# Patient Record
Sex: MALE | Race: BLACK OR AFRICAN AMERICAN | NOT HISPANIC OR LATINO | URBAN - METROPOLITAN AREA
[De-identification: names, ages, dates, MRNs, and addresses within clinical notes are randomized per-mention and may not be internally consistent; named-entity substitution may affect disease eponyms.]

---

## 2018-01-02 ENCOUNTER — EMERGENCY (EMERGENCY)
Facility: HOSPITAL | Age: 23
LOS: 1 days | Discharge: HOME | End: 2018-01-02
Admitting: EMERGENCY MEDICINE
Payer: COMMERCIAL

## 2018-01-02 VITALS
RESPIRATION RATE: 18 BRPM | HEART RATE: 84 BPM | DIASTOLIC BLOOD PRESSURE: 78 MMHG | OXYGEN SATURATION: 98 % | TEMPERATURE: 98 F | SYSTOLIC BLOOD PRESSURE: 124 MMHG
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
## 2018-01-02 DIAGNOSIS — M79.644 PAIN IN RIGHT FINGER(S): ICD-10-CM
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
## 2018-01-02 DIAGNOSIS — S60.011A CONTUSION OF RIGHT THUMB WITHOUT DAMAGE TO NAIL, INITIAL ENCOUNTER: ICD-10-CM
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
## 2018-01-02 DIAGNOSIS — S61.011A LACERATION WITHOUT FOREIGN BODY OF RIGHT THUMB WITHOUT DAMAGE TO NAIL, INITIAL ENCOUNTER: ICD-10-CM

## 2018-01-02 PROCEDURE — 73140 X-RAY EXAM OF FINGER(S): CPT | Mod: 26,RT

## 2018-01-02 PROCEDURE — 99283 EMERGENCY DEPT VISIT LOW MDM: CPT | Mod: 25

## 2018-01-02 RX ORDER — CEPHALEXIN 500 MG
500 CAPSULE ORAL ONCE
Qty: 0 | Refills: 0 | Status: COMPLETED | OUTPATIENT
Start: 2018-01-02 | End: 2018-01-02

## 2018-01-02 RX ORDER — CEPHALEXIN 500 MG
1 CAPSULE ORAL
Qty: 28 | Refills: 0 | OUTPATIENT
Start: 2018-01-02 | End: 2018-01-08

## 2018-01-02 RX ADMIN — Medication 500 MILLIGRAM(S): at 03:38

## 2018-01-02 NOTE — ED PROVIDER NOTE - MEDICAL DECISION MAKING DETAILS
No fracture or dislocation on xray. Likely flexor tendon injury. No signs of infection at this time. Wound is too old to suture. Cleaned/bandaged, Rx Keflex, Ortho/Hand F/U in 24-48 hours for further management and wound check. Strict return precautions reviewed with pt in which pt verbalizes understanding and agrees to.

## 2018-01-02 NOTE — ED PROVIDER NOTE - OBJECTIVE STATEMENT
23 y/o M presents c/o right thumb injury after accidentally slamming his thumb in a car door 1 week ago. He states he sustained a laceration which he cleaned thoroughly and bandaged however he never seeked medical attention. He now p/w persistent localized pain, paresthesias and inability to flex his thumb. States he is concerned he has a fracture.

## 2018-01-02 NOTE — ED PROVIDER NOTE - SKIN WOUND TYPE
LACERATION(S)/1.5cm laceration to palmar aspect of right thumb. No swelling, fluctuance, warmth, erythema, streaking or discharge.

## 2022-04-03 ENCOUNTER — HOSPITAL ENCOUNTER (EMERGENCY)
Age: 27
Discharge: HOME | End: 2022-04-04
Attending: EMERGENCY MEDICINE

## 2022-04-03 DIAGNOSIS — F15.10 METHAMPHETAMINE ABUSE (HCC): ICD-10-CM

## 2022-04-03 DIAGNOSIS — R45.851 SUICIDAL IDEATION: Primary | ICD-10-CM

## 2022-04-03 DIAGNOSIS — Z59.00 HOMELESSNESS: ICD-10-CM

## 2022-04-03 LAB
ALBUMIN SERPL-MCNC: 3.3 G/DL (ref 3.5–5)
ALBUMIN/GLOB SERPL: 1.1 {RATIO} (ref 1.2–3.5)
ALP SERPL-CCNC: 43 U/L (ref 50–136)
ALT SERPL-CCNC: 22 U/L (ref 12–65)
AMPHET UR QL SCN: POSITIVE
ANION GAP SERPL CALC-SCNC: 6 MMOL/L (ref 7–16)
APAP SERPL-MCNC: <2 UG/ML (ref 10–30)
AST SERPL-CCNC: 12 U/L (ref 15–37)
ATRIAL RATE: 87 BPM
BARBITURATES UR QL SCN: NEGATIVE
BASOPHILS # BLD: 0 K/UL (ref 0–0.2)
BASOPHILS NFR BLD: 0 % (ref 0–2)
BENZODIAZ UR QL: NEGATIVE
BILIRUB SERPL-MCNC: 0.3 MG/DL (ref 0.2–1.1)
BILIRUB UR QL: NEGATIVE
BUN SERPL-MCNC: 10 MG/DL (ref 6–23)
CALCIUM SERPL-MCNC: 8.6 MG/DL (ref 8.3–10.4)
CALCULATED P AXIS, ECG09: 77 DEGREES
CALCULATED R AXIS, ECG10: 74 DEGREES
CALCULATED T AXIS, ECG11: 56 DEGREES
CANNABINOIDS UR QL SCN: POSITIVE
CHLORIDE SERPL-SCNC: 105 MMOL/L (ref 98–107)
CO2 SERPL-SCNC: 30 MMOL/L (ref 21–32)
COCAINE UR QL SCN: NEGATIVE
CREAT SERPL-MCNC: 0.8 MG/DL (ref 0.8–1.5)
DIAGNOSIS, 93000: NORMAL
DIFFERENTIAL METHOD BLD: ABNORMAL
EOSINOPHIL # BLD: 0.3 K/UL (ref 0–0.8)
EOSINOPHIL NFR BLD: 3 % (ref 0.5–7.8)
ERYTHROCYTE [DISTWIDTH] IN BLOOD BY AUTOMATED COUNT: 14.3 % (ref 11.9–14.6)
ETHANOL SERPL-MCNC: <3 MG/DL
GLOBULIN SER CALC-MCNC: 3 G/DL (ref 2.3–3.5)
GLUCOSE SERPL-MCNC: 71 MG/DL (ref 65–100)
GLUCOSE UR QL STRIP.AUTO: NEGATIVE MG/DL
HCT VFR BLD AUTO: 40.4 % (ref 41.1–50.3)
HGB BLD-MCNC: 13.3 G/DL (ref 13.6–17.2)
IMM GRANULOCYTES # BLD AUTO: 0 K/UL (ref 0–0.5)
IMM GRANULOCYTES NFR BLD AUTO: 0 % (ref 0–5)
KETONES UR-MCNC: NEGATIVE MG/DL
LEUKOCYTE ESTERASE UR QL STRIP: NEGATIVE
LYMPHOCYTES # BLD: 2.5 K/UL (ref 0.5–4.6)
LYMPHOCYTES NFR BLD: 24 % (ref 13–44)
MAGNESIUM SERPL-MCNC: 2.1 MG/DL (ref 1.8–2.4)
MCH RBC QN AUTO: 27.9 PG (ref 26.1–32.9)
MCHC RBC AUTO-ENTMCNC: 32.9 G/DL (ref 31.4–35)
MCV RBC AUTO: 84.7 FL (ref 79.6–97.8)
METHADONE UR QL: NEGATIVE
MONOCYTES # BLD: 0.9 K/UL (ref 0.1–1.3)
MONOCYTES NFR BLD: 9 % (ref 4–12)
NEUTS SEG # BLD: 6.6 K/UL (ref 1.7–8.2)
NEUTS SEG NFR BLD: 64 % (ref 43–78)
NITRITE UR QL: NEGATIVE
NRBC # BLD: 0 K/UL (ref 0–0.2)
OPIATES UR QL: NEGATIVE
P-R INTERVAL, ECG05: 154 MS
PCP UR QL: NEGATIVE
PH UR: 7 [PH] (ref 5–9)
PLATELET # BLD AUTO: 289 K/UL (ref 150–450)
PMV BLD AUTO: 9.5 FL (ref 9.4–12.3)
POTASSIUM SERPL-SCNC: 3.5 MMOL/L (ref 3.5–5.1)
PROT SERPL-MCNC: 6.3 G/DL (ref 6.3–8.2)
PROT UR QL: NEGATIVE MG/DL
Q-T INTERVAL, ECG07: 378 MS
QRS DURATION, ECG06: 84 MS
QTC CALCULATION (BEZET), ECG08: 454 MS
RBC # BLD AUTO: 4.77 M/UL (ref 4.23–5.6)
RBC # UR STRIP: ABNORMAL /UL
SALICYLATES SERPL-MCNC: 1.8 MG/DL (ref 2.8–20)
SERVICE CMNT-IMP: ABNORMAL
SODIUM SERPL-SCNC: 141 MMOL/L (ref 138–145)
SP GR UR: 1.01 (ref 1–1.02)
UROBILINOGEN UR QL: 0.2 EU/DL (ref 0.2–1)
VENTRICULAR RATE, ECG03: 87 BPM
WBC # BLD AUTO: 10.4 K/UL (ref 4.3–11.1)

## 2022-04-03 PROCEDURE — 83735 ASSAY OF MAGNESIUM: CPT

## 2022-04-03 PROCEDURE — 82077 ASSAY SPEC XCP UR&BREATH IA: CPT

## 2022-04-03 PROCEDURE — 80143 DRUG ASSAY ACETAMINOPHEN: CPT

## 2022-04-03 PROCEDURE — 99285 EMERGENCY DEPT VISIT HI MDM: CPT

## 2022-04-03 PROCEDURE — 85025 COMPLETE CBC W/AUTO DIFF WBC: CPT

## 2022-04-03 PROCEDURE — 80053 COMPREHEN METABOLIC PANEL: CPT

## 2022-04-03 PROCEDURE — 80307 DRUG TEST PRSMV CHEM ANLYZR: CPT

## 2022-04-03 PROCEDURE — 74011250637 HC RX REV CODE- 250/637: Performed by: EMERGENCY MEDICINE

## 2022-04-03 PROCEDURE — 80179 DRUG ASSAY SALICYLATE: CPT

## 2022-04-03 PROCEDURE — 93005 ELECTROCARDIOGRAM TRACING: CPT | Performed by: EMERGENCY MEDICINE

## 2022-04-03 PROCEDURE — 81003 URINALYSIS AUTO W/O SCOPE: CPT

## 2022-04-03 RX ORDER — QUETIAPINE FUMARATE 25 MG/1
50 TABLET, FILM COATED ORAL
Status: DISCONTINUED | OUTPATIENT
Start: 2022-04-03 | End: 2022-04-05 | Stop reason: HOSPADM

## 2022-04-03 RX ORDER — QUETIAPINE FUMARATE 25 MG/1
25 TABLET, FILM COATED ORAL
Status: DISCONTINUED | OUTPATIENT
Start: 2022-04-03 | End: 2022-04-05 | Stop reason: HOSPADM

## 2022-04-03 RX ADMIN — QUETIAPINE FUMARATE 50 MG: 25 TABLET ORAL at 21:05

## 2022-04-03 SDOH — ECONOMIC STABILITY - HOUSING INSECURITY: HOMELESSNESS UNSPECIFIED: Z59.00

## 2022-04-03 NOTE — ED PROVIDER NOTES
61-year-old male with history of homelessness, schizophrenia, medication noncompliance, marijuana use , methamphetamine use, presents with complaint of suicidal ideations. Patient was seen at [facility] ED earlier today with similar complaints. Patient with no plan at that time. Patient states that his current plan is to drive car into a gas station and it to set on fire and explored. Patient states he currently lives at the [facility] homeless shelter. Denies HI, AVH, paranoid delusions. Denies chest pain, shortness breath, nausea, vomiting, fever, chills. States that he smokes cigarettes. The history is provided by the patient. No  was used. Mental Health Problem   This is a recurrent problem. The current episode started more than 2 days ago. The problem has not changed since onset. Pertinent negatives include no confusion, no somnolence, no seizures, no weakness, no agitation and no hallucinations. History reviewed. No pertinent past medical history. History reviewed. No pertinent surgical history. History reviewed. No pertinent family history.     Social History     Socioeconomic History    Marital status: SINGLE     Spouse name: Not on file    Number of children: Not on file    Years of education: Not on file    Highest education level: Not on file   Occupational History    Not on file   Tobacco Use    Smoking status: Not on file    Smokeless tobacco: Not on file   Substance and Sexual Activity    Alcohol use: Not on file    Drug use: Not on file    Sexual activity: Not on file   Other Topics Concern    Not on file   Social History Narrative    Not on file     Social Determinants of Health     Financial Resource Strain:     Difficulty of Paying Living Expenses: Not on file   Food Insecurity:     Worried About Running Out of Food in the Last Year: Not on file    Guerline of Food in the Last Year: Not on file   Transportation Needs:     Lack of Transportation (Medical): Not on file    Lack of Transportation (Non-Medical): Not on file   Physical Activity:     Days of Exercise per Week: Not on file    Minutes of Exercise per Session: Not on file   Stress:     Feeling of Stress : Not on file   Social Connections:     Frequency of Communication with Friends and Family: Not on file    Frequency of Social Gatherings with Friends and Family: Not on file    Attends Roman [employer] Services: Not on file    Active Member of [address] [place] [address] [employer] or Organizations: Not on file    Attends Club or Organization Meetings: Not on file    Marital Status: Not on file   Intimate Partner Violence:     Fear of Current or Ex-Partner: Not on file    Emotionally Abused: Not on file    Physically Abused: Not on file    Sexually Abused: Not on file   Housing Stability:     Unable to Pay for Housing in the Last Year: Not on file    Number of [place] in the Last Year: Not on file    Unstable Housing in the Last Year: Not on file         ALLERGIES: Patient has no known allergies. Review of Systems   Constitutional: Negative for chills, fatigue and fever. HENT: Negative for congestion and rhinorrhea. Respiratory: Negative for cough and shortness of breath. Cardiovascular: Negative for chest pain and palpitations. Gastrointestinal: Negative for abdominal pain, diarrhea, nausea and vomiting. Genitourinary: Negative for dysuria and flank pain. Musculoskeletal: Negative for arthralgias, back pain and myalgias. Skin: Negative for rash and wound. Neurological: Negative for dizziness, seizures, weakness and headaches. Hematological: Does not bruise/bleed easily. Psychiatric/Behavioral: Positive for suicidal ideas. Negative for agitation, confusion and hallucinations. The patient is not nervous/anxious.         Vitals:    04/03/22 0224   BP: 126/81   Pulse: (!) 102   Resp: 20   Temp: 98 °F (36.7 °C)   SpO2: 99%   Weight: 63.5 kg (140 lb)   Height: 6' 2\" (1.88 m)            Physical Exam  Vitals and nursing note reviewed. Constitutional:       Comments: Malodorous. Disheveled in appearance. HENT:      Head: Normocephalic. Nose: Nose normal.      Mouth/Throat:      Mouth: Mucous membranes are moist.   Eyes:      Extraocular Movements: Extraocular movements intact. Conjunctiva/sclera: Conjunctivae normal.      Pupils: Pupils are equal, round, and reactive to light. Cardiovascular:      Rate and Rhythm: Normal rate. Pulses: Normal pulses. Heart sounds: Normal heart sounds. Pulmonary:      Effort: Pulmonary effort is normal.      Breath sounds: Normal breath sounds. Abdominal:      General: Bowel sounds are normal.      Palpations: Abdomen is soft. Tenderness: There is no abdominal tenderness. There is no guarding or rebound. Comments: Soft, nontender, nondistended. No rebound or guarding   Musculoskeletal:         General: No swelling. Normal range of motion. Right lower leg: No edema. Left lower leg: No edema. Skin:     Findings: No erythema or rash. Neurological:      General: No focal deficit present. Mental Status: He is alert and oriented to person, place, and time. Cranial Nerves: No cranial nerve deficit. Motor: No weakness. Psychiatric:      Comments: Reports current SI. Denies HI, AVH, paranoid delusions. Agitated. MDM  Number of Diagnoses or Management Options  Homelessness: new and requires workup  Malingering: new and requires workup  Diagnosis management comments: Patient was seen at [facility] ED earlier today and discharged. Patient with history of malingering. Psychiatry been consulted to evaluate patient. Awaiting recommendations.        Amount and/or Complexity of Data Reviewed  Clinical lab tests: ordered and reviewed  Tests in the medicine section of CPT®: ordered and reviewed  Review and summarize past medical records: yes  Discuss the patient with other providers: yes  Independent visualization of images, tracings, or specimens: yes    Risk of Complications, Morbidity, and/or Mortality  Presenting problems: moderate  Diagnostic procedures: moderate  Management options: moderate  General comments: Results Include:    Recent Results (from the past 24 hour(s))  -EKG, 12 LEAD, INITIAL:   Collection Time: 04/03/22  2:59 AM       Result                      Value             Ref Range           Ventricular Rate            87                BPM                 Atrial Rate                 87                BPM                 P-R Interval                154               ms                  QRS Duration                84                ms                  Q-T Interval                378               ms                  QTC Calculation (Bezet)     454               ms                  Calculated P Axis           77                degrees             Calculated R Axis           74                degrees             Calculated T Axis           56                degrees             Diagnosis                                                     Normal sinus rhythm Nonspecific ST abnormality Abnormal ECG No previous ECGs available   -CBC WITH AUTOMATED DIFF:   Collection Time: 04/03/22  3:13 AM       Result                      Value             Ref Range           WBC                         10.4              4.3 - 11.1 K*       RBC                         4.77              4.23 - 5.6 M*       HGB                         13.3 (L)          13.6 - 17.2 *       HCT                         40.4 (L)          41.1 - 50.3 %       MCV                         84.7              79.6 - 97.8 *       MCH                         27.9              26.1 - 32.9 *       MCHC                        32.9              31.4 - 35.0 *       RDW                         14.3              11.9 - 14.6 %       PLATELET                    289               150 - 450 K/*       MPV                         9.5               9.4 - 12.3 FL ABSOLUTE NRBC               0.00              0.0 - 0.2 K/*       DF                          AUTOMATED                             NEUTROPHILS                 64                43 - 78 %           LYMPHOCYTES                 24                13 - 44 %           MONOCYTES                   9                 4.0 - 12.0 %        EOSINOPHILS                 3                 0.5 - 7.8 %         BASOPHILS                   0                 0.0 - 2.0 %         IMMATURE GRANULOCYTES       0                 0.0 - 5.0 %         ABS. NEUTROPHILS            6.6               1.7 - 8.2 K/*       ABS. LYMPHOCYTES            2.5               0.5 - 4.6 K/*       ABS. MONOCYTES              0.9               0.1 - 1.3 K/*       ABS. EOSINOPHILS            0.3               0.0 - 0.8 K/*       ABS. BASOPHILS              0.0               0.0 - 0.2 K/*       ABS. IMM.  GRANS.            0.0               0.0 - 0.5 K/*  -METABOLIC PANEL, COMPREHENSIVE:   Collection Time: 04/03/22  3:13 AM       Result                      Value             Ref Range           Sodium                      141               138 - 145 mm*       Potassium                   3.5               3.5 - 5.1 mm*       Chloride                    105               98 - 107 mmo*       CO2                         30                21 - 32 mmol*       Anion gap                   6 (L)             7 - 16 mmol/L       Glucose                     71                65 - 100 mg/*       BUN                         10                6 - 23 MG/DL        Creatinine                  0.80              0.8 - 1.5 MG*       GFR est AA                  >60               >60 ml/min/1*       GFR est non-AA              >60               >60 ml/min/1*       Calcium                     8.6               8.3 - 10.4 M*       Bilirubin, total            0.3               0.2 - 1.1 MG*       ALT (SGPT)                  22                12 - 65 U/L         AST (SGOT)                  12 (L)            15 - 37 U/L         Alk. phosphatase            43 (L)            50 - 136 U/L        Protein, total              6.3               6.3 - 8.2 g/*       Albumin                     3.3 (L)           3.5 - 5.0 g/*       Globulin                    3.0               2.3 - 3.5 g/*       A-G Ratio                   1.1 (L)           1.2 - 3.5      -SALICYLATE:   Collection Time: 04/03/22  3:13 AM       Result                      Value             Ref Range           Salicylate level            1.8 (L)           2.8 - 20.0 M*  -ACETAMINOPHEN:   Collection Time: 04/03/22  3:13 AM       Result                      Value             Ref Range           Acetaminophen level         <2 (L)            10.0 - 30.0 *  -ETHYL ALCOHOL:   Collection Time: 04/03/22  3:13 AM       Result                      Value             Ref Range           ALCOHOL(ETHYL),SERUM        <3                MG/DL          -MAGNESIUM:   Collection Time: 04/03/22  3:13 AM       Result                      Value             Ref Range           Magnesium                   2.1               1.8 - 2.4 mg*      Patient Progress  Patient progress: stable    ED Course as of 04/03/22 0529   Sun Apr 03, 1852   0476 Salicylate level(!): 1.8 [DF]      ED Course User Index  [DF] Bladimir Grissom MD       EKG    Date/Time: 4/3/2022 5:29 AM  Performed by: Bladimir Grissom MD  Authorized by: Bladimir Grissom MD     ECG reviewed by ED Physician in the absence of a cardiologist: yes    Rate:     ECG rate:  87    ECG rate assessment: normal    Rhythm:     Rhythm: sinus rhythm    Ectopy:     Ectopy: none    QRS:     QRS axis:  Normal    QRS intervals:  Normal  Conduction:     Conduction: normal    ST segments:     ST segments:  Normal  T waves:     T waves: normal                             Curtis Theodore MD; 4/3/2022 @5:26 AM Voice dictation software was used during the making of this note.   This software is not perfect and grammatical and other typographical errors may be present.   This note has not been proofread for errors.  ===================================================================

## 2022-04-03 NOTE — ED NOTES
Patient awake in bed eating at this time. No signs or symptoms of distress. Respirations even and unlabored. Safety precautions in place.

## 2022-04-03 NOTE — ED NOTES
Constant Observer Yes - Name: Eduardo Benjamin   High risk patients are in line of sight at all times Yes   Excess equipment/medical supplies not necessary for the care of the patient removed Yes   All sharp or dangerous objects are removed from room: including but not limited to belts, pens & pencils, needles, medications, cosmetics, lighters, matches, nail files, watches, necklaces, glass objects, razors, razor blades, knives, aerosol sprays, drawstring pants, shoes, cords (telephone, call bells, etc.) cleaning wipes or other cleaning items, aluminum cans, not permanently attached wall décor Yes   Telephone/cell phone removed as well as TV remote (batteries can be swallowed) Yes   Patient belongings removed and secured Yes   Excess linen is removed from room Yes   All plastic bags are removed from the room and replaced with paper trash bags Yes   Patient is in paper scrubs or appropriate gown and using hospital socks with rubber soles Yes   No metal, hard eating utensils or hard plates are on meal tray Yes   Remove all cleaning agents used by [employer] Yes   If Crucifix is hanging on a nail, remove Crucifix as well as the nail Yes

## 2022-04-03 NOTE — ED NOTES
Constant Observer Yes - Name: Torsten Sauceda Observer Oriented YES   High risk patients are in line of sight at all times Yes   Excess equipment/medical supplies not necessary for the care of the patient removed Yes   All sharp or dangerous objects are removed from room: including but not limited to belts, pens & pencils, needles, medications, cosmetics, lighters, matches, nail files, watches, necklaces, glass objects, razors, razor blades, knives, aerosol sprays, drawstring pants, shoes, cords (telephone, call bells, etc.) cleaning wipes or other cleaning items, aluminum cans, not permanently attached wall décor Yes   Telephone/cell phone removed as well as TV remote (batteries can be swallowed) Yes   Patient belongings removed and labeled at nurses station Yes   Excess linen is removed from room Yes   All plastic bags are removed from the room and replaced with paper trash bags Yes   Patient is in paper scrubs or appropriate gown and using hospital socks with rubber soles Yes   No metal, hard eating utensils or hard plates are on meal tray Yes   Remove all cleaning agents used by [employer] Yes   If Crucifix is hanging on a nail, remove Crucifix as well as the nail Yes       *If any question above is answered \"No,\" documentation is required.

## 2022-04-03 NOTE — ED NOTES
Psychiatry feels the patient needs inpatient status. Of note patient has been evaluated multiple times at [facility] with alternate decision making by psychiatry there. Patient has been seen 13 times in the last 15 days for similar complaints though.   Patient may very well benefit from reevaluation with his ER stay to see if he is suitable before 3 days

## 2022-04-03 NOTE — ED NOTES
Patient asleep at this time. No signs or symptoms of distress. Respirations even and unlabored. Safety precautions in place.

## 2022-04-03 NOTE — ED NOTES
Patient changed into paper scrubs. No signs or symptoms of distress. Respirations even and unlabored. Safety precautions initiated.

## 2022-04-03 NOTE — ED TRIAGE NOTES
Pt presents to the ED from home with c/o SI ideations today. Pt states being under stress lately and had a plan to drive his vehicle off the side of the road.  Pt denies HI ideations, auditory hallucinations and visual hallucination

## 2022-04-03 NOTE — ED NOTES
Patient asleep in stretcher at this time. No signs or symptoms of distress. Respirations even and unlabored. Safety precautions in place.

## 2022-04-04 VITALS
HEART RATE: 91 BPM | TEMPERATURE: 97.2 F | DIASTOLIC BLOOD PRESSURE: 78 MMHG | HEIGHT: 74 IN | SYSTOLIC BLOOD PRESSURE: 125 MMHG | OXYGEN SATURATION: 98 % | BODY MASS INDEX: 17.97 KG/M2 | RESPIRATION RATE: 17 BRPM | WEIGHT: 140 LBS

## 2022-04-04 PROCEDURE — 74011250637 HC RX REV CODE- 250/637: Performed by: EMERGENCY MEDICINE

## 2022-04-04 RX ORDER — CLONAZEPAM 0.5 MG/1
1 TABLET ORAL
Status: COMPLETED | OUTPATIENT
Start: 2022-04-04 | End: 2022-04-04

## 2022-04-04 RX ORDER — IBUPROFEN 200 MG
1 TABLET ORAL EVERY 24 HOURS
Status: DISCONTINUED | OUTPATIENT
Start: 2022-04-04 | End: 2022-04-05 | Stop reason: HOSPADM

## 2022-04-04 RX ORDER — QUETIAPINE FUMARATE 50 MG/1
50 TABLET, FILM COATED ORAL 2 TIMES DAILY
Qty: 10 TABLET | Refills: 0 | Status: SHIPPED | OUTPATIENT
Start: 2022-04-04 | End: 2022-04-09

## 2022-04-04 RX ADMIN — CLONAZEPAM 1 MG: 0.5 TABLET ORAL at 13:17

## 2022-04-04 RX ADMIN — QUETIAPINE FUMARATE 25 MG: 25 TABLET ORAL at 13:17

## 2022-04-04 NOTE — ED NOTES
Constant Observer Yes - Name: Jose   Constant Observer Oriented YES   High risk patients are in line of sight at all times Yes   Excess equipment/medical supplies not necessary for the care of the patient removed Yes   All sharp or dangerous objects are removed from room: including but not limited to belts, pens & pencils, needles, medications, cosmetics, lighters, matches, nail files, watches, necklaces, glass objects, razors, razor blades, knives, aerosol sprays, drawstring pants, shoes, cords (telephone, call bells, etc.) cleaning wipes or other cleaning items, aluminum cans, not permanently attached wall décor Yes   Telephone/cell phone removed as well as TV remote (batteries can be swallowed) Yes   Patient belongings removed and labeled at nurses station Yes   Excess linen is removed from room Yes   All plastic bags are removed from the room and replaced with paper trash bags Yes   Patient is in paper scrubs or appropriate gown and using hospital socks with rubber soles Yes   No metal, hard eating utensils or hard plates are on meal tray Yes   Remove all cleaning agents used by [employer] Yes   If Crucifix is hanging on a nail, remove Crucifix as well as the nail Yes       *If any question above is answered \"No,\" documentation is required.

## 2022-04-04 NOTE — ED NOTES
Pt resting in bed, watching TV, no distress noted, respirations even and unlabored. Safety precautions in place. Sitter at bedside.

## 2022-04-04 NOTE — PROGRESS NOTES
[facility]                                                                                                 PATIENT INFORMATION   Patient Name: Mallory Inches: [de-identified] Patient MRN: [id number]   Address: [place] [address] Patient CSN: [id number]      Evangelical: Pentecostalism   Sex: Male Marital Status: SINGLE   : 1995 Age:   32 yrs   Home Phone: [phone]  Mobile Phone:           Race: BLACK/ Employer:   Not Employed   Language: ENGLISH Admitted/Arrived From:      ADMISSION INFORMATION   Admit Date: 4/3/2022 Admit Time:  2:46 AM   Patient Class: Emergency Service: Emergency   Admit Source: Non-health care facility* Admit Type: EMERGENCY   Admitting Provider:   Attending Provider: Karen Porter, *   Unit: [address] Emergency Dept Room/Bed: ER09/09    Admission Diagnosis:  and codes:      Emergency Complaint: Mental Health Problems                Discharge Date:   Discharge Time:     GUARANTOR INFORMATION   Name:  Mellisa Leon Address: [address] [place] [address]. Rel:  Self   Phone: [address] , [address] : 1995   EMERGENCY CONTACTS   Name: Desiree Stone Home:  Mobile:    [phone] Rel: Mother   COVERAGE INFORMATION   Primary Insurance:   N/A Subscriber:     Plan Name:   Pt Rel to Subscriber:     Claim Address: NA Sex:        Policy #:  N/A    Group #: N/A Group Name:       Auth #: N/A Ins Phone:         Secondary Insurance:   Subscriber:     Plan Name:   Pt Rel to Subscriber:     Claim Address: NA Sex:       Policy #: N/A    Group #: N/A Group Name: N/A   Auth #: N/A Ins Phone:         Accident Date:    Accident Type:     PROVIDER INFORMATION   PCP:         None PCP Phone:  None   Referring Prov:   No ref.  provider found Referring Phone:  Referring Fax:  N/A      Advanced Directive:  Not Received Research:     Lab Client:   Enrollment Status:              Printed on 22 10:55 AM Page

## 2022-04-04 NOTE — PROGRESS NOTES
Chart review complete, CM met with pt at bedside, pt found laying on stretcher alert and oriented x4, awakens easily to speak with CM, pt states he is homeless and was staying at the shelter up until last pm, states he has been having SI and has plan to run car off the road, pt states has been seen multiple times at [facility] and feels they were not helping him so he had the [address] to bring him to the ED for evaluation. Pt has been seen by tele psych and was placed on IVC papers which will need to be updated if needed on 4/6/22. CM also noted pt has multiple visits in the [facility] ED and was  instructed to follow up with [facility] and states he has not done so and wants CM to help with getting him there, CM told him that a referral can be made from the ED but he will have to go to the [facility] for them to agree to see him. Pt tells CM today that he is no longer having suicidal thoughts and wants to get a ride back to the [facility] department to get his car. MD updated and plans on holding pt for now. Pt remains calm. CM will remain available to assist as needed. Care Management Interventions  PCP Verified by CM:  Yes (made aware of [facility] and [facility])  MyChart Signup: No  Discharge Durable Medical Equipment: No  Physical Therapy Consult: No  Occupational Therapy Consult: No  Speech Therapy Consult: No  Support Systems: Parent(s)  Confirm Follow Up Transport: Family  Discharge Location  Patient Expects to be Discharged to[de-identified] Home

## 2022-04-04 NOTE — ED NOTES
Patient somewhat uncooperative at times loud and confrontational and other times asleep. Patient insisting he is no longer self harming thoughts. Have asked patient to be reevaluated by telepsychiatry. Yesterday they deemed patient needing for inpatient psychiatric setting.

## 2022-04-04 NOTE — PROGRESS NOTES
Pt found standing in doorway talking very loudly to staff, pending re-evaluation from tele psych, wants to go home.  CM will remain available, no responses from referral placed this am.

## 2022-04-04 NOTE — ED NOTES
Patient is on the phone talking to a friend about how people are out to kill him and he feels like dying and is extremely agitated

## 2022-04-04 NOTE — ED NOTES
Pt resting in bed, no distress noted, respirations even and unlabored. Safety precautions in place.  Sitter at bedside

## 2022-04-04 NOTE — ED NOTES
Pt resting in bed, no distress noted, respirations even and unlabored. Safety precautions in place. Sitter at bedside.

## 2022-04-04 NOTE — ED NOTES
Constant Observer Yes - Name: Werner Victoria   Constant Observer Oriented YES   High risk patients are in line of sight at all times Yes   Excess equipment/medical supplies not necessary for the care of the patient removed Yes   All sharp or dangerous objects are removed from room: including but not limited to belts, pens & pencils, needles, medications, cosmetics, lighters, matches, nail files, watches, necklaces, glass objects, razors, razor blades, knives, aerosol sprays, drawstring pants, shoes, cords (telephone, call bells, etc.) cleaning wipes or other cleaning items, aluminum cans, not permanently attached wall décor Yes   Telephone/cell phone removed as well as TV remote (batteries can be swallowed) Yes   Patient belongings removed and labeled at nurses station Yes   Excess linen is removed from room Yes   All plastic bags are removed from the room and replaced with paper trash bags Yes   Patient is in paper scrubs or appropriate gown and using hospital socks with rubber soles Yes   No metal, hard eating utensils or hard plates are on meal tray Yes   Remove all cleaning agents used by [employer] Yes   If Crucifix is hanging on a nail, remove Crucifix as well as the nail Yes       *If any question above is answered \"No,\" documentation is required.

## 2022-04-05 NOTE — ED PROVIDER NOTES
At the request of the patient, the patient reassessed by psychiatry. Psychiatry at this point thinks that the patient's commitment papers can be reversed and that his major underlying condition is drug abuse. We will reverse the patient's commitment, discharge him on a 5-day treatment of Seroquel with instructions to follow-up with mental health tomorrow. Will also be given a referral to [facility] should he so desire drug treatment, as well as referral to [facility].

## 2022-04-05 NOTE — ED NOTES
Patient discharged at this time. Patient in no acute distress. Safety plan created with patient. Patient verbalizes understanding of safety plan. Denies any SI/HI ideations at this time. VSS. Prescriptions discussed and patient verbalizes understanding of necessity.

## 2022-05-06 ENCOUNTER — HOSPITAL ENCOUNTER (EMERGENCY)
Age: 27
Discharge: HOME | End: 2022-05-08
Attending: EMERGENCY MEDICINE

## 2022-05-06 DIAGNOSIS — F32.A DEPRESSION, UNSPECIFIED DEPRESSION TYPE: Primary | ICD-10-CM

## 2022-05-06 DIAGNOSIS — R45.851 SUICIDAL IDEATION: ICD-10-CM

## 2022-05-06 LAB
ALBUMIN SERPL-MCNC: 3.3 G/DL (ref 3.5–5)
ALBUMIN/GLOB SERPL: 1 {RATIO} (ref 1.2–3.5)
ALP SERPL-CCNC: 49 U/L (ref 50–136)
ALT SERPL-CCNC: 27 U/L (ref 12–65)
ANION GAP SERPL CALC-SCNC: 4 MMOL/L (ref 7–16)
APAP SERPL-MCNC: <2 UG/ML (ref 10–30)
AST SERPL-CCNC: 28 U/L (ref 15–37)
BASOPHILS # BLD: 0 K/UL (ref 0–0.2)
BASOPHILS NFR BLD: 1 % (ref 0–2)
BILIRUB SERPL-MCNC: 0.3 MG/DL (ref 0.2–1.1)
BUN SERPL-MCNC: 13 MG/DL (ref 6–23)
CALCIUM SERPL-MCNC: 8.5 MG/DL (ref 8.3–10.4)
CHLORIDE SERPL-SCNC: 109 MMOL/L (ref 98–107)
CO2 SERPL-SCNC: 30 MMOL/L (ref 21–32)
CREAT SERPL-MCNC: 0.9 MG/DL (ref 0.8–1.5)
DIFFERENTIAL METHOD BLD: ABNORMAL
EOSINOPHIL # BLD: 0.3 K/UL (ref 0–0.8)
EOSINOPHIL NFR BLD: 3 % (ref 0.5–7.8)
ERYTHROCYTE [DISTWIDTH] IN BLOOD BY AUTOMATED COUNT: 15.3 % (ref 11.9–14.6)
ETHANOL SERPL-MCNC: <3 MG/DL
GLOBULIN SER CALC-MCNC: 3.3 G/DL (ref 2.3–3.5)
GLUCOSE SERPL-MCNC: 76 MG/DL (ref 65–100)
HCT VFR BLD AUTO: 39.4 % (ref 41.1–50.3)
HGB BLD-MCNC: 12.7 G/DL (ref 13.6–17.2)
IMM GRANULOCYTES # BLD AUTO: 0 K/UL (ref 0–0.5)
IMM GRANULOCYTES NFR BLD AUTO: 1 % (ref 0–5)
LYMPHOCYTES # BLD: 1.8 K/UL (ref 0.5–4.6)
LYMPHOCYTES NFR BLD: 20 % (ref 13–44)
MAGNESIUM SERPL-MCNC: 1.9 MG/DL (ref 1.8–2.4)
MCH RBC QN AUTO: 27.5 PG (ref 26.1–32.9)
MCHC RBC AUTO-ENTMCNC: 32.2 G/DL (ref 31.4–35)
MCV RBC AUTO: 85.5 FL (ref 79.6–97.8)
MONOCYTES # BLD: 0.8 K/UL (ref 0.1–1.3)
MONOCYTES NFR BLD: 9 % (ref 4–12)
NEUTS SEG # BLD: 5.7 K/UL (ref 1.7–8.2)
NEUTS SEG NFR BLD: 66 % (ref 43–78)
NRBC # BLD: 0 K/UL (ref 0–0.2)
PLATELET # BLD AUTO: 323 K/UL (ref 150–450)
PMV BLD AUTO: 9.7 FL (ref 9.4–12.3)
POTASSIUM SERPL-SCNC: 4 MMOL/L (ref 3.5–5.1)
PROT SERPL-MCNC: 6.6 G/DL (ref 6.3–8.2)
RBC # BLD AUTO: 4.61 M/UL (ref 4.23–5.6)
SALICYLATES SERPL-MCNC: <1.7 MG/DL (ref 2.8–20)
SODIUM SERPL-SCNC: 143 MMOL/L (ref 138–145)
WBC # BLD AUTO: 8.6 K/UL (ref 4.3–11.1)

## 2022-05-06 PROCEDURE — 80053 COMPREHEN METABOLIC PANEL: CPT

## 2022-05-06 PROCEDURE — 80307 DRUG TEST PRSMV CHEM ANLYZR: CPT

## 2022-05-06 PROCEDURE — 80179 DRUG ASSAY SALICYLATE: CPT

## 2022-05-06 PROCEDURE — 83735 ASSAY OF MAGNESIUM: CPT

## 2022-05-06 PROCEDURE — 99285 EMERGENCY DEPT VISIT HI MDM: CPT

## 2022-05-06 PROCEDURE — 82077 ASSAY SPEC XCP UR&BREATH IA: CPT

## 2022-05-06 PROCEDURE — 85025 COMPLETE CBC W/AUTO DIFF WBC: CPT

## 2022-05-06 PROCEDURE — 80143 DRUG ASSAY ACETAMINOPHEN: CPT

## 2022-05-06 PROCEDURE — 93005 ELECTROCARDIOGRAM TRACING: CPT | Performed by: EMERGENCY MEDICINE

## 2022-05-06 RX ORDER — OLANZAPINE 5 MG/1
10 TABLET, ORALLY DISINTEGRATING ORAL
Status: DISCONTINUED | OUTPATIENT
Start: 2022-05-06 | End: 2022-05-06

## 2022-05-07 LAB
AMPHET UR QL SCN: POSITIVE
BARBITURATES UR QL SCN: NEGATIVE
BENZODIAZ UR QL: NEGATIVE
CANNABINOIDS UR QL SCN: POSITIVE
COCAINE UR QL SCN: NEGATIVE
METHADONE UR QL: NEGATIVE
OPIATES UR QL: NEGATIVE
PCP UR QL: NEGATIVE

## 2022-05-07 PROCEDURE — 81003 URINALYSIS AUTO W/O SCOPE: CPT

## 2022-05-07 PROCEDURE — 74011250637 HC RX REV CODE- 250/637: Performed by: EMERGENCY MEDICINE

## 2022-05-07 RX ORDER — ESCITALOPRAM OXALATE 10 MG/1
10 TABLET ORAL DAILY
Status: DISCONTINUED | OUTPATIENT
Start: 2022-05-07 | End: 2022-05-08 | Stop reason: HOSPADM

## 2022-05-07 RX ADMIN — ESCITALOPRAM OXALATE 10 MG: 10 TABLET ORAL at 09:27

## 2022-05-07 NOTE — ED TRIAGE NOTES
Pt arrived via EMS from side of road c/o SI, anxiety and depression. Pt states that he is having stressful things in life. Pt states that his meds have not been helping. Pt states that he was previously in [facility] 3 weeks ago. States that he does not have plan.  Denies A/VH, drugs and ETOH

## 2022-05-07 NOTE — ED NOTES
Pt roomed, wanded down by security and asked to change into blue scrubs. Pts valuables such as $48 worth of money, credit cards, wallet, silver chain, and bracelets banged in small white bag and placed behind garage. Those items along with pts clothes, shoes and phone, placed behind garage and garage gate closed.
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
## 2022-05-07 NOTE — ED NOTES
Constant Observer Yes - Name: Brigette BarriosSUMI   Constant Observer Oriented YES   High risk patients are in line of sight at all times Yes   Excess equipment/medical supplies not necessary for the care of the patient removed Yes   All sharp or dangerous objects are removed from room: including but not limited to belts, pens & pencils, needles, medications, cosmetics, lighters, matches, nail files, watches, necklaces, glass objects, razors, razor blades, knives, aerosol sprays, drawstring pants, shoes, cords (telephone, call bells, etc.) cleaning wipes or other cleaning items, aluminum cans, not permanently attached wall décor Yes   Telephone/cell phone removed as well as TV remote (batteries can be swallowed) Yes   Patient belongings removed and labeled at nurses station Yes   Excess linen is removed from room Yes   All plastic bags are removed from the room and replaced with paper trash bags Yes   Patient is in paper scrubs or appropriate gown and using hospital socks with rubber soles Yes   No metal, hard eating utensils or hard plates are on meal tray Yes   Remove all cleaning agents used by [employer] Yes   If Crucifix is hanging on a nail, remove Crucifix as well as the nail Yes       *If any question above is answered \"No,\" documentation is required.

## 2022-05-07 NOTE — ED NOTES
Constant Observer Yes - Name: Meera   Constant Observer Oriented YES   High risk patients are in line of sight at all times Yes   Excess equipment/medical supplies not necessary for the care of the patient removed Yes   All sharp or dangerous objects are removed from room: including but not limited to belts, pens & pencils, needles, medications, cosmetics, lighters, matches, nail files, watches, necklaces, glass objects, razors, razor blades, knives, aerosol sprays, drawstring pants, shoes, cords (telephone, call bells, etc.) cleaning wipes or other cleaning items, aluminum cans, not permanently attached wall décor Yes   Telephone/cell phone removed as well as TV remote (batteries can be swallowed) Yes   Patient belongings removed and labeled at nurses station Yes   Excess linen is removed from room Yes   All plastic bags are removed from the room and replaced with paper trash bags Yes   Patient is in paper scrubs or appropriate gown and using hospital socks with rubber soles Yes   No metal, hard eating utensils or hard plates are on meal tray Yes   Remove all cleaning agents used by [employer] Yes   If Crucifix is hanging on a nail, remove Crucifix as well as the nail Yes       *If any question above is answered \"No,\" documentation is required.

## 2022-05-08 VITALS
SYSTOLIC BLOOD PRESSURE: 130 MMHG | RESPIRATION RATE: 14 BRPM | HEIGHT: 74 IN | BODY MASS INDEX: 17.97 KG/M2 | WEIGHT: 140 LBS | HEART RATE: 65 BPM | OXYGEN SATURATION: 99 % | TEMPERATURE: 98.2 F | DIASTOLIC BLOOD PRESSURE: 85 MMHG

## 2022-05-08 PROCEDURE — 74011250637 HC RX REV CODE- 250/637: Performed by: EMERGENCY MEDICINE

## 2022-05-08 RX ORDER — ESCITALOPRAM OXALATE 10 MG/1
10 TABLET ORAL DAILY
Qty: 14 TABLET | Refills: 0 | Status: SHIPPED | OUTPATIENT
Start: 2022-05-08

## 2022-05-08 RX ADMIN — ESCITALOPRAM OXALATE 10 MG: 10 TABLET ORAL at 11:13

## 2022-05-08 NOTE — ED NOTES
Constant Observer Yes - Name: Meera GONZALEZ   Constant Observer Oriented YES   High risk patients are in line of sight at all times Yes   Excess equipment/medical supplies not necessary for the care of the patient removed Yes   All sharp or dangerous objects are removed from room: including but not limited to belts, pens & pencils, needles, medications, cosmetics, lighters, matches, nail files, watches, necklaces, glass objects, razors, razor blades, knives, aerosol sprays, drawstring pants, shoes, cords (telephone, call bells, etc.) cleaning wipes or other cleaning items, aluminum cans, not permanently attached wall décor Yes   Telephone/cell phone removed as well as TV remote (batteries can be swallowed) Yes   Patient belongings removed and labeled at nurses station Yes   Excess linen is removed from room Yes   All plastic bags are removed from the room and replaced with paper trash bags Yes   Patient is in paper scrubs or appropriate gown and using hospital socks with rubber soles Yes   No metal, hard eating utensils or hard plates are on meal tray Yes   Remove all cleaning agents used by [employer] Yes   If Crucifix is hanging on a nail, remove Crucifix as well as the nail Yes       *If any question above is answered \"No,\" documentation is required.

## 2022-05-08 NOTE — ED NOTES
Constant Observer Yes - Name: Sitter   Constant Observer Oriented YES   High risk patients are in line of sight at all times Yes   Excess equipment/medical supplies not necessary for the care of the patient removed Yes   All sharp or dangerous objects are removed from room: including but not limited to belts, pens & pencils, needles, medications, cosmetics, lighters, matches, nail files, watches, necklaces, glass objects, razors, razor blades, knives, aerosol sprays, drawstring pants, shoes, cords (telephone, call bells, etc.) cleaning wipes or other cleaning items, aluminum cans, not permanently attached wall décor Yes   Telephone/cell phone removed as well as TV remote (batteries can be swallowed) Yes   Patient belongings removed and labeled at nurses station Yes   Excess linen is removed from room Yes   All plastic bags are removed from the room and replaced with paper trash bags Yes   Patient is in paper scrubs or appropriate gown and using hospital socks with rubber soles Yes   No metal, hard eating utensils or hard plates are on meal tray Yes   Remove all cleaning agents used by [employer] Yes   If Crucifix is hanging on a nail, remove Crucifix as well as the nail Yes       *If any question above is answered \"No,\" documentation is required.

## 2022-05-08 NOTE — ED NOTES
Patient is a 80-year-old male presents ER complaint to be suicidal, direction positive for amphetamines. History of polysubstance abuse. Patient was initially placed on papers after being seen by psychiatry. Has been given Lexapro daily. Patient did have an outburst today, stemming from patient being upset because he wishes to have his papers reversed and states he is ready to leave. States he is very antsy wants to leave the emergency department. Patient was brought back to his room. Much more calm and compliant currently. I did go and have a conversation with patient, he states that he is no longer suicidal, is not homicidal and states he is wished to go. States he has to work so that he can pay a fine by the end of the month. Also has a young child when she is anxious to see. Patient appreciative to have his papers reversed. Given follow-up instructions to follow-up with the [facility] as well as [facility]. Also given strict return precautions. He voiced understanding and agreement with this plan.

## 2022-05-08 NOTE — ED NOTES
Pt lying on right side with blanket over head. Sitter outside of door. Lights out for comfort. No needs voiced.

## 2022-05-08 NOTE — DISCHARGE INSTRUCTIONS
Follow-up with [facility] as well as the [facility] [place] [facility] in 2 to 3 days peer return to the ER for worsening or worrisome symptoms.

## 2022-05-08 NOTE — ED NOTES
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
## 2022-05-09 LAB
BILIRUB UR QL: NEGATIVE
GLUCOSE UR QL STRIP.AUTO: NEGATIVE MG/DL
KETONES UR-MCNC: NEGATIVE MG/DL
LEUKOCYTE ESTERASE UR QL STRIP: NEGATIVE
NITRITE UR QL: NEGATIVE
PH UR: 7 [PH] (ref 5–9)
PROT UR QL: NEGATIVE MG/DL
RBC # UR STRIP: ABNORMAL /UL
SP GR UR: >1.03 (ref 1–1.02)
UROBILINOGEN UR QL: 1 EU/DL (ref 0.2–1)

## 2022-08-30 ENCOUNTER — HOSPITAL ENCOUNTER (EMERGENCY)
Age: 27
Discharge: HOME | End: 2022-08-31
Attending: EMERGENCY MEDICINE

## 2022-08-30 DIAGNOSIS — Z76.5 MALINGERING: ICD-10-CM

## 2022-08-30 DIAGNOSIS — Z59.819 HOUSING INSTABILITY: Primary | ICD-10-CM

## 2022-08-30 PROCEDURE — 99283 EMERGENCY DEPT VISIT LOW MDM: CPT

## 2022-08-30 SDOH — ECONOMIC STABILITY - HOUSING INSECURITY: HOUSING INSTABILITY UNSPECIFIED: Z59.819

## 2022-08-30 ASSESSMENT — PAIN SCALES - GENERAL: PAINLEVEL_OUTOF10: 0

## 2022-08-30 ASSESSMENT — PAIN - FUNCTIONAL ASSESSMENT: PAIN_FUNCTIONAL_ASSESSMENT: 0-10

## 2022-08-31 VITALS
DIASTOLIC BLOOD PRESSURE: 73 MMHG | HEART RATE: 80 BPM | OXYGEN SATURATION: 100 % | SYSTOLIC BLOOD PRESSURE: 119 MMHG | TEMPERATURE: 98.6 F | RESPIRATION RATE: 16 BRPM | HEIGHT: 75 IN | BODY MASS INDEX: 18.65 KG/M2 | WEIGHT: 150 LBS

## 2022-08-31 ASSESSMENT — ENCOUNTER SYMPTOMS
RHINORRHEA: 0
VOMITING: 0
SHORTNESS OF BREATH: 0
NAUSEA: 0
ABDOMINAL PAIN: 0

## 2022-08-31 NOTE — ED TRIAGE NOTES
Patient picked up from the back of the [facility] center. Police called for resources. Patient denies SI. Patient presents looking for assistance with job/housing.

## 2022-08-31 NOTE — ED NOTES
Pt resting in bed w/ eyes closed and no obvious signs or sx of distress. [id number].      Anmol Chun RN  08/31/22 [id number]

## 2022-08-31 NOTE — ED NOTES
Pt resting in bed w/ eyes closed w/ no obvious signs or sx of distress. RR15.       Consuelo Chun RN  08/31/22 [id number]

## 2022-08-31 NOTE — ED NOTES
Pt resting in bed w/ eyes closed and no obvious signs or sx of distress.  5 Nichole Kerr RN  08/31/22 5396

## 2022-08-31 NOTE — ED PROVIDER NOTES
[employer] Emergency Department Provider Note                   PCP:                None Provider               Age: 32 y.o. Sex: male       ICD-10-CM    1. Housing instability  Z59.9       2. Malingering  Z76.5           DISPOSITION Decision To Discharge 08/30/2022 11:38:56 PM        MDM  Number of Diagnoses or Management Options  Housing instability: new, needed workup  Malingering  Diagnosis management comments: Patient with cell phone that is able to use Wi-Fi in charge but does not have the number that he can be contacted by case management. Will have patient stay in ED overnight until case management arrives to help arrange for housing, additional assistance. Denies SI, HI, AVH. Patient in no acute distress at this time. Amount and/or Complexity of Data Reviewed  Review and summarize past medical records: yes    Risk of Complications, Morbidity, and/or Mortality  Presenting problems: low  Diagnostic procedures: low  Management options: low    Patient Progress  Patient progress: stable       No orders of the defined types were placed in this encounter. Medications - No data to display    New Prescriptions    No medications on file        Ella Tate is a 32 y.o. male who presents to the Emergency Department with chief complaint of needs resources. Chief Complaint   Patient presents with    Other     Patient looking for resources       57-year-old male with history of depression, polysubstance abuse, antisocial personality disorder presents to ED via EMS from back of local [facility] center. Police called EMS for patient to be brought to ER to help arrange for resources. Patient denies SI, HI, AVH, paranoid delusions. Patient states that he has a cell phone and is able to charge it particularly use Wi-Fi. States he does not currently have a number and it is turned off.   States that he previously was living with girlfriend before he was recently incarcerated at local [facility] center. States that he is \"trying to work things out with her before moving back in\". Denies SI, HI, AVH, paranoid delusions. Patient states that he would like to speak with case management about assistance with housing and finding a job. States that he occasionally smokes marijuana. Denies alcohol use. Reports smoking cigarettes. Denies any significant past surgical or family history. States that he is currently homeless given he was released from half-way center. Rates symptoms as constant. Denies any alleviating or exacerbating factors. States that he has not attempted to contact local homeless shelter. The history is provided by the patient. No  was used. Review of Systems   Constitutional:  Negative for chills and fever. HENT:  Negative for congestion and rhinorrhea. Respiratory:  Negative for shortness of breath. Cardiovascular:  Negative for chest pain. Gastrointestinal:  Negative for abdominal pain, nausea and vomiting. Genitourinary:  Negative for dysuria and flank pain. Musculoskeletal:  Negative for myalgias and neck pain. Skin:  Negative for rash. Neurological:  Negative for light-headedness and headaches. Psychiatric/Behavioral:  Negative for hallucinations and suicidal ideas. The patient is not nervous/anxious. History reviewed. No pertinent past medical history. History reviewed. No pertinent surgical history. History reviewed. No pertinent family history. Social History     Socioeconomic History    Marital status: Single     Spouse name: None    Number of children: None    Years of education: None    Highest education level: None         Patient has no known allergies. Previous Medications    No medications on file        Vitals signs and nursing note reviewed. No data found. Physical Exam  Vitals and nursing note reviewed. Constitutional:       Appearance: Normal appearance.       Comments: Patient in no acute distress. HENT:      Head: Normocephalic. Eyes:      Extraocular Movements: Extraocular movements intact. Pupils: Pupils are equal, round, and reactive to light. Cardiovascular:      Rate and Rhythm: Normal rate. Pulses: Normal pulses. Pulmonary:      Effort: Pulmonary effort is normal.   Abdominal:      Palpations: Abdomen is soft. Tenderness: There is no abdominal tenderness. There is no guarding. Musculoskeletal:         General: No deformity. Normal range of motion. Skin:     General: Skin is warm. Findings: No rash. Neurological:      General: No focal deficit present. Mental Status: He is alert and oriented to person, place, and time. Cranial Nerves: No cranial nerve deficit. Sensory: No sensory deficit. Motor: No weakness. Psychiatric:         Mood and Affect: Mood normal.         Behavior: Behavior normal.         Thought Content: Thought content normal.      Comments: Denies SI, HI, AVH, paranoid delusions. Patient with no thoughts of wanting to harm himself or others. Procedures      Results Include:    No results found for this or any previous visit (from the past 24 hour(s)). No orders to display                          Voice dictation software was used during the making of this note. This software is not perfect and grammatical and other typographical errors may be present. This note has not been completely proofread for errors.      Jj Tony MD  08/31/22 [id number]

## 2022-08-31 NOTE — ED NOTES
Pt resting in bed w/ eyes closed and no obvious signs or sx of distress.  [address] [facility] STU Chun, SURJIT  08/31/22 0859

## 2022-08-31 NOTE — ED NOTES
Pt resting in bed w/ eyes closed and no obvious signs or sx of distress.  [address] [facility] [address] Adiel, RN  08/31/22 9277

## 2022-08-31 NOTE — CARE COORDINATION
Chart review complete, CM met with pt who states he came to the hospital instead of going to snf, pt states he has a hx of mental health issues requests referrals to [facility] and at [place] would like a ride to [facility] office. Pt denies SI/HI at this time. Pt also states he is homeless states his girlfriend of 4 years kicked him out and then called the police on him, shelter information provided to pt as well. Pt was provided transport to [facility] with round trip per request.    Referral also made to [facility] per pt request, clinicals faxed. 08/31/22 9543   Service Assessment   Patient Orientation Alert and Oriented   Cognition Alert   History Provided By Patient   Primary Caregiver Self   Accompanied By/Relationship none   Support Systems None   Prior Functional Level Independent in ADLs/IADLs   Current Functional Level Independent in ADLs/IADLs   Ability to make needs known: Good   Family able to assist with home care needs: No   Would you like for me to discuss the discharge plan with any other family members/significant others, and if so, who? No   Community Resources Lodging   CM/SW Referral Shelter placement   Social/Functional History   Type of Home Homeless   ADL Assistance Independent   Ambulation Assistance Independent   Transfer Assistance Independent   Occupation Unemployed   Discharge Planning   Type of [address] Prior To Admission None   Potential Assistance Needed N/A   DME Ordered? No   Potential Assistance Purchasing Medications No   Type of Home Care Services None   Patient expects to be discharged to:  Shelter

## 2022-08-31 NOTE — CARE COORDINATION
[facility]                                                                                                 PATIENT INFORMATION   Patient Name: Merline Sanders, [address]: [de-identified] Patient MRN: [id number]   Address: [address] [place] [address]  Patient CSN: [id number]      Samaritan: Deysi Lala   Sex: Male Marital Status: Single   : 1995 Age:   32 yrs   Home Phone: [phone]  Mobile Phone:   [phone]        Race: Rula Steinberg /  Employer:   Not Employed   Language: English Admitted/Arrived From:      ADMISSION INFORMATION   Admit Date: 2022 Admit Time:  9:34 PM   Patient Class: Emergency Service: Emergency medicine   Admit Source: Non-health care facility* Admit Type: Emergency   Admitting Provider:   Attending Provider:     Unit: [address] [place] [address] Emergency Dept Room/Bed: ER12/12    Admission Diagnosis:  and codes:      Emergency Complaint: Suicidal                Discharge Date: 2022 Discharge Time: 10:37 AM    GUARANTOR INFORMATION   Name:  Tatiana Jeans Address: [address] [place] [address] [place]  Rel:  Self   Phone: [phone]   [address] [place] [address] Way [id number] : 1995   EMERGENCY CONTACTS   Name: Scott Woodsing:  Mobile:    [phone] Rel: Parent   COVERAGE INFORMATION   Primary Insurance:   N/A Subscriber:     Plan Name:   Pt Rel to Subscriber:     Claim Address: NA Sex:        Policy #:  N/A    Group #: N/A Group Name:       Auth #: Auth number: N/A Ins Phone:         Secondary Insurance:   Subscriber:     Plan Name:   Pt Rel to Subscriber:     Claim Address: NA Sex:       Policy #: N/A    Group #: N/A Group Name: N/A   Auth #: N/A Ins Phone:         Accident Date:    Accident Type:     PROVIDER INFORMATION   PCP:         None Provider PCP Phone:  None   Referring Prov:   No ref.  provider found Referring Phone:  Referring Fax:  N/A      Advanced Directive:  <no information> Research:     Lab Client:   Enrollment Status:            Printed on 8/31/22 10:43 AM Page

## 2022-08-31 NOTE — ED NOTES
Refused DC vitals. I have reviewed discharge instructions with the patient. The patient verbalized understanding. Patient left ED via Discharge Method: ambulatory to Home with roundtrip. Opportunity for questions and clarification provided. Patient given 0 scripts. To continue your aftercare when you leave the hospital, you may receive an automated call from our care team to check in on how you are doing. This is a free service and part of our promise to provide the best care and service to meet your aftercare needs.  If you have questions, or wish to unsubscribe from this service please call [phone]. Thank you for Choosing our [facility] Emergency Department.         Crissy Galvan RN  08/31/22 1639

## 2022-08-31 NOTE — ED NOTES
Constant Observer Yes - Name: sitter   Constant Observer Oriented yes   High risk patients are in line of sight at all times Yes   Excess equipment/medical supplies not necessary for the care of the patient removed Yes   All sharp or dangerous objects are removed from room: including but not limited to belts, pens & pencils, needles, medications, cosmetics, lighters, matches, nail files, watches, necklaces, glass objects, razors, razor blades, knives, aerosol sprays, drawstring pants, shoes, cords (telephone, call bells, etc.) cleaning wipes or other cleaning items, aluminum cans, not permanently attached wall décor Yes   Telephone/cell phone removed as well as TV remote (batteries can be swallowed) Yes   Patient belongings removed and labeled at nurses station Yes   Excess linen is removed from room Yes   All plastic bags are removed from the room and replaced with paper trash bags Yes   Patient is in paper scrubs or appropriate gown and using hospital socks with rubber soles Yes   No metal, hard eating utensils or hard plates are on meal tray Yes   Remove all cleaning agents used by [employer] Yes   If Crucifix is hanging on a nail, remove Crucifix as well as the nail Yes       *If any question above is answered \"No,\" documentation is required.         Jared Clinton RN  08/31/22 4732

## 2022-08-31 NOTE — ED PROVIDER NOTES
Daily psych / social rounds 9:01 AM on 8/31/22  Just released from detention yesterday and when he assessed the amount of legal groups he had to jump throughout his lack of resources such as a job or housing, he \"called the [employer] himself]\".   Presents to the ER in an effort to get \"resources\", will have social work see him, and get a list of shelters abdomen normal  Adelina Barlow MD  08/31/22 2752

## 2022-08-31 NOTE — ED NOTES
Pt resting in bed w/ eyes closed and no obvious signs or sx of distress.  [address] [facility] STU Chun, SURJIT  08/31/22 1938

## 2022-08-31 NOTE — ED NOTES
Pt resting in bed w/ eyes closed w/ no obvious signs or sx of distress. MH15.       [facility] Adiel, SURJIT  08/31/22 5795

## 2022-08-31 NOTE — ED NOTES
Pt resting in bed w/ eyes closed and no obvious signs or sx of distress     Thierry Hidalgo RN  08/31/22 3019

## 2022-08-31 NOTE — ED NOTES
Pt resting in bed w/ eyes closed and no obvious signs or sx of distress.  [address] [facility] STU Chun, SURJIT  08/31/22 3844

## 2023-01-14 ENCOUNTER — HOSPITAL ENCOUNTER (EMERGENCY)
Age: 28
Discharge: HOME | End: 2023-01-14
Attending: EMERGENCY MEDICINE

## 2023-01-14 VITALS
HEIGHT: 75 IN | OXYGEN SATURATION: 100 % | RESPIRATION RATE: 14 BRPM | WEIGHT: 140 LBS | TEMPERATURE: 98.8 F | SYSTOLIC BLOOD PRESSURE: 134 MMHG | BODY MASS INDEX: 17.41 KG/M2 | HEART RATE: 92 BPM | DIASTOLIC BLOOD PRESSURE: 84 MMHG

## 2023-01-14 DIAGNOSIS — F19.10 SUBSTANCE ABUSE (HCC): ICD-10-CM

## 2023-01-14 DIAGNOSIS — Z76.5 MALINGERING: Primary | ICD-10-CM

## 2023-01-14 PROCEDURE — 99285 EMERGENCY DEPT VISIT HI MDM: CPT

## 2023-01-14 ASSESSMENT — ENCOUNTER SYMPTOMS
VOMITING: 0
COUGH: 0
EYE ITCHING: 0
NAUSEA: 0
SHORTNESS OF BREATH: 0
HYPERVENTILATION: 0
DIARRHEA: 0
CHEST TIGHTNESS: 0
EYE DISCHARGE: 0

## 2023-01-14 ASSESSMENT — PAIN - FUNCTIONAL ASSESSMENT: PAIN_FUNCTIONAL_ASSESSMENT: NONE - DENIES PAIN

## 2023-01-15 NOTE — DISCHARGE INSTRUCTIONS
Stop using illegal drugs  Follow-up with mental health on Monday.   Is very important for you to be compliant and regular with your medications  Return to ER for any worsening symptoms or new problems which may arise
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
## 2023-01-15 NOTE — ED NOTES
Patient left the ED without discharge paper work. After Verbal argument with provider.       Patricia Muñiz RN  01/14/23 8825

## 2023-01-15 NOTE — ED PROVIDER NOTES
Emergency Department Provider Note                   PCP:                On File Not (Inactive)               Age: 32 y.o. Sex: male       ICD-10-CM    1. Malingering  Z76.5       2. Substance abuse ([facility] [address]  F19.10           DISPOSITION Decision To Discharge 01/14/2023 10:25:19 PM       Medical Decision Making  History obtained from patient and EMS    I reviewed the records from [address] [address] [facility] [address] earlier today  At that time he had had an evaluation by psychiatry and was cleared. They did not feel that he had any acute psychiatric needs that would require observation and/or inpatient treatment. My evaluation today is consistent with our findings from earlier tonight as well  Patient does not have any active plan for any homicidal or suicidal ideations  He really perseverates on variety of past issues. He continues to use amphetamine and marijuana. Despite being told otherwise, he still believes that these are helping him and not causing potential harm or deterioration of his current condition    Again, when I told the patient that he did not meet criteria for further emergency department work-up he became hyper aggressive, verbally abusive to multiple providers and threatening to multiple staff members  Eventually, the patient left the emergency department without further incident       Complexity of Problem: 1 stable, chronic illness. (3)      I have reviewed records from an external source: ED records from outside this hospital.  Considerations: Reviewed appropriate follow-up with patient. Due to his high rate and verbally abusive behavior, I limited any further interactions with the patient  Social determinant affecting care: housing insecurity  Social determinant affecting care: substance abuse      Patient was discharged risks and benefits of hospitalization were discussed. Orders Placed This Encounter   Procedures    ADULT DIET;  Regular    CONSTANT OBSERVATION    Suicide precautions Medications - No data to display    New Prescriptions    No medications on file        Jordan Okeefe is a 32 y.o. male who presents to the Emergency Department with chief complaint of    Chief Complaint   Patient presents with    Suicidal      71-year-old male patient with a long history of substance abuse, noncompliance with medical treatment presents to the ER with a chief complaint of feeling suicidal  States that he does not feel comfortable living in society otherwise very vague and cannot really delineate what he means    I reviewed records from [address] from earlier this evening  Apparently the patient was seen by psychiatry felt not to be a threat to himself or others. He was discharged. He became loud and threatening and there waiting room. Chart indicates that he was given a ride to the cold shelter this evening. Patient states that that never happened    The history is provided by the patient. Mental Health Problem  Presenting symptoms: aggressive behavior, agitation and suicidal thoughts    Presenting symptoms: no homicidal ideas, no self-mutilation, no suicidal threats and no suicide attempt    Degree of incapacity (severity):  Mild  Onset quality:  Gradual  Duration:  6 months  Timing:  Intermittent  Progression:  Waxing and waning  Chronicity:  Chronic  Context: drug abuse and noncompliance    Treatment compliance:  None of the time  Relieved by:  None tried  Worsened by:  Drugs  Ineffective treatments:  None tried  Associated symptoms: anhedonia, anxiety, distractible and poor judgment    Associated symptoms: no chest pain, no headaches and no hyperventilation    Risk factors: family hx of mental illness and hx of mental illness       Review of Systems   Constitutional:  Negative for chills and fever. HENT:  Negative for hearing loss, sneezing and tinnitus. Eyes:  Negative for discharge and itching. Respiratory:  Negative for cough, chest tightness and shortness of breath. Cardiovascular:  Negative for chest pain and palpitations. Gastrointestinal:  Negative for diarrhea, nausea and vomiting. Endocrine: Negative for cold intolerance, heat intolerance, polydipsia, polyphagia and polyuria. Genitourinary:  Negative for dysuria, hematuria and urgency. Musculoskeletal:  Negative for arthralgias and myalgias. Skin:  Negative for rash and wound. Allergic/Immunologic: Negative for immunocompromised state. Neurological:  Negative for seizures, syncope and headaches. Hematological: Negative. Psychiatric/Behavioral:  Positive for agitation, behavioral problems, dysphoric mood and suicidal ideas. Negative for homicidal ideas and self-injury. The patient is nervous/anxious. All other systems reviewed and are negative. No past medical history on file. No past surgical history on file. No family history on file. Social History     Socioeconomic History    Marital status: Single        Allergies: Patient has no known allergies. Previous Medications    No medications on file        Vitals signs and nursing note reviewed. Patient Vitals for the past 4 hrs:   Temp Pulse Resp BP SpO2   01/14/23 2207 98.8 °F (37.1 °C) 92 14 134/84 100 %          Physical Exam  Vitals and nursing note reviewed. Constitutional:       General: He is not in acute distress. Appearance: Normal appearance. He is normal weight. HENT:      Head: Normocephalic and atraumatic. Nose: Nose normal.      Mouth/Throat:      Mouth: Mucous membranes are moist.      Pharynx: Oropharynx is clear. Eyes:      General: No scleral icterus. Extraocular Movements: Extraocular movements intact. Conjunctiva/sclera: Conjunctivae normal.      Pupils: Pupils are equal, round, and reactive to light. Cardiovascular:      Rate and Rhythm: Normal rate and regular rhythm. Pulses: Normal pulses. Heart sounds: Normal heart sounds.    Pulmonary:      Effort: Pulmonary effort is normal. No respiratory distress. Breath sounds: Normal breath sounds. Abdominal:      General: Abdomen is flat. Bowel sounds are normal. There is no distension. Palpations: Abdomen is soft. There is no mass. Tenderness: There is no abdominal tenderness. Musculoskeletal:         General: No deformity or signs of injury. Normal range of motion. Cervical back: Normal range of motion and neck supple. Skin:     General: Skin is warm and dry. Capillary Refill: Capillary refill takes less than 2 seconds. Neurological:      General: No focal deficit present. Mental Status: He is alert and oriented to person, place, and time. Psychiatric:         Mood and Affect: Mood normal. Affect is labile and blunt. Speech: Speech is rapid and pressured. Behavior: Behavior is agitated, aggressive and combative. Cognition and Memory: Cognition and memory normal.         Judgment: Judgment is impulsive and inappropriate. Comments: Patient was calm and cooperative during the majority of my interview and exam.  He became quite irate swearing and verbally abusive when I informed him that there is no indication for further work-up or evaluation as this has been completed within the last several hours at another facility. Procedures    ED EKG Interpretation  EKG was interpreted in the absence of a cardiologist.        No results found for any visits on 01/14/23. No orders to display                         Voice dictation software was used during the making of this note. This software is not perfect and grammatical and other typographical errors may be present. This note has not been completely proofread for errors.      Maurice Gamble MD  01/14/23 6343
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
## 2023-01-15 NOTE — ED NOTES
I have not reviewed discharge instructions with the patient. The patient did not verbalize understanding. Patient left ED via Discharge Method: ambulatory to Unknown location with (Self). Opportunity for questions and clarification provided. Patient given 0 scripts. To continue your aftercare when you leave the hospital, you may receive an automated call from our care team to check in on how you are doing. This is a free service and part of our promise to provide the best care and service to meet your aftercare needs.  If you have questions, or wish to unsubscribe from this service please call [phone]. Thank you for Choosing our [facility] Emergency Department.         Garett Watts RN  01/14/23 0842

## 2023-05-01 NOTE — ED ADULT NURSE NOTE - PAIN: PRESENCE, MLM
Group Topic: BH Coping Skills Education    Date: 5/1/2023  Start Time: 1030  End Time: 1115  Facilitators: Amberly Gentile    Focus: Journaling  Number in attendance: 10    Pt was invited to engage in a journaling exercise, focusing on gratitude, letting go, self-awareness, and forgiveness. Pt was provided with over 20 templates to choose from that guided patient on how to journal on people, place, items, memories that they felt grateful for, letters saying goodbye to whoever or whatever they need to let go of, and self-awareness and self-esteem questions to answer.    Patients were then given a worksheet to complete that prompted them to list various things they were grateful for, including a personal strength, a memory, a challenge, something that comforts them, etc. Patients were given the opportunity to write a letter to someone they wanted to show gratitude towards and were reminded that they could write a letter to themselves as well OR write a \"goodbye\" letter to something or someone in their life that they no longer feel gratitude towards and do not need to be apart of. Patients individually shared their letters and processed with this writer. To end the group, patients were given the option to crumple up their letters that they did not want another person to receive and metaphorically let go of anything they were suppressing by throwing the crumpled ball of paper against the wall as hard as they go.       Method: Group  Attendance: Present  Participation: Minimal  Patient Response: Appeared distracted, Impaired concentration and Quiet  Mood: Depressed  Affect: Type: Dysthymic   Range: Blunted/flat   Congruency: Congruent   Stability: Stable  Behavior/Socialization: Guarded, Indifferent, Passive and Withdrawn  Thought Process: Unremarkable  Task Performance: Needs cueing  Patient Evaluation: Attends- needs encouragement to engage    Pt presented with a flat affect. Pt minimally engaged in the journaling  activity. Writer asked if pt needed support in any way as pt appeared to be in a depressed mood, and pt declined. Pt stated \"I'll just sit here, it's fine.\"    Amberly Gentile, CTRS           complains of pain/discomfort

## 2023-07-04 ENCOUNTER — HOSPITAL ENCOUNTER (EMERGENCY)
Age: 28
Discharge: HOME | End: 2023-07-05
Attending: EMERGENCY MEDICINE

## 2023-07-04 DIAGNOSIS — F22 PARANOID DELUSION (HCC): Primary | ICD-10-CM

## 2023-07-04 LAB
AMPHET UR QL SCN: NEGATIVE
ANION GAP SERPL CALC-SCNC: 4 MMOL/L (ref 2–11)
BARBITURATES UR QL SCN: NEGATIVE
BASOPHILS # BLD: 0 K/UL (ref 0–0.2)
BASOPHILS NFR BLD: 0 % (ref 0–2)
BENZODIAZ UR QL: NEGATIVE
BUN SERPL-MCNC: 9 MG/DL (ref 6–23)
CALCIUM SERPL-MCNC: 9.5 MG/DL (ref 8.3–10.4)
CANNABINOIDS UR QL SCN: POSITIVE
CHLORIDE SERPL-SCNC: 108 MMOL/L (ref 101–110)
CO2 SERPL-SCNC: 29 MMOL/L (ref 21–32)
COCAINE UR QL SCN: NEGATIVE
CREAT SERPL-MCNC: 1 MG/DL (ref 0.8–1.5)
DIFFERENTIAL METHOD BLD: ABNORMAL
EOSINOPHIL # BLD: 0 K/UL (ref 0–0.8)
EOSINOPHIL NFR BLD: 0 % (ref 0.5–7.8)
ERYTHROCYTE [DISTWIDTH] IN BLOOD BY AUTOMATED COUNT: 14.1 % (ref 11.9–14.6)
ETHANOL SERPL-MCNC: 32 MG/DL (ref 0–0.08)
GLUCOSE SERPL-MCNC: 89 MG/DL (ref 65–100)
HCT VFR BLD AUTO: 45.9 % (ref 41.1–50.3)
HGB BLD-MCNC: 14.7 G/DL (ref 13.6–17.2)
IMM GRANULOCYTES # BLD AUTO: 0 K/UL (ref 0–0.5)
IMM GRANULOCYTES NFR BLD AUTO: 0 % (ref 0–5)
LYMPHOCYTES # BLD: 1.3 K/UL (ref 0.5–4.6)
LYMPHOCYTES NFR BLD: 13 % (ref 13–44)
MCH RBC QN AUTO: 28.3 PG (ref 26.1–32.9)
MCHC RBC AUTO-ENTMCNC: 32 G/DL (ref 31.4–35)
MCV RBC AUTO: 88.3 FL (ref 82–102)
METHADONE UR QL: NEGATIVE
MONOCYTES # BLD: 0.7 K/UL (ref 0.1–1.3)
MONOCYTES NFR BLD: 7 % (ref 4–12)
NEUTS SEG # BLD: 7.9 K/UL (ref 1.7–8.2)
NEUTS SEG NFR BLD: 80 % (ref 43–78)
NRBC # BLD: 0 K/UL (ref 0–0.2)
OPIATES UR QL: NEGATIVE
PCP UR QL: NEGATIVE
PLATELET # BLD AUTO: 305 K/UL (ref 150–450)
PMV BLD AUTO: 9.9 FL (ref 9.4–12.3)
POTASSIUM SERPL-SCNC: 4.2 MMOL/L (ref 3.5–5.1)
RBC # BLD AUTO: 5.2 M/UL (ref 4.23–5.6)
SODIUM SERPL-SCNC: 141 MMOL/L (ref 133–143)
WBC # BLD AUTO: 10 K/UL (ref 4.3–11.1)

## 2023-07-04 PROCEDURE — 99285 EMERGENCY DEPT VISIT HI MDM: CPT

## 2023-07-04 PROCEDURE — 80048 BASIC METABOLIC PNL TOTAL CA: CPT

## 2023-07-04 PROCEDURE — 80307 DRUG TEST PRSMV CHEM ANLYZR: CPT

## 2023-07-04 PROCEDURE — 82077 ASSAY SPEC XCP UR&BREATH IA: CPT

## 2023-07-04 PROCEDURE — 6370000000 HC RX 637 (ALT 250 FOR IP): Performed by: EMERGENCY MEDICINE

## 2023-07-04 PROCEDURE — 85025 COMPLETE CBC W/AUTO DIFF WBC: CPT

## 2023-07-04 RX ORDER — OLANZAPINE 5 MG/1
10 TABLET, ORALLY DISINTEGRATING ORAL
Status: COMPLETED | OUTPATIENT
Start: 2023-07-04 | End: 2023-07-04

## 2023-07-04 RX ADMIN — OLANZAPINE 10 MG: 5 TABLET, ORALLY DISINTEGRATING ORAL at 22:43

## 2023-07-04 ASSESSMENT — LIFESTYLE VARIABLES
HOW MANY STANDARD DRINKS CONTAINING ALCOHOL DO YOU HAVE ON A TYPICAL DAY: 1 OR 2
HOW OFTEN DO YOU HAVE A DRINK CONTAINING ALCOHOL: 2-3 TIMES A WEEK

## 2023-07-05 VITALS
RESPIRATION RATE: 14 BRPM | HEART RATE: 65 BPM | BODY MASS INDEX: 18.65 KG/M2 | SYSTOLIC BLOOD PRESSURE: 115 MMHG | OXYGEN SATURATION: 96 % | HEIGHT: 75 IN | DIASTOLIC BLOOD PRESSURE: 76 MMHG | WEIGHT: 150 LBS | TEMPERATURE: 98.3 F

## 2023-07-05 PROBLEM — Z59.819 HOUSING INSTABILITY: Status: ACTIVE | Noted: 2023-07-05

## 2023-07-05 PROBLEM — F41.9 ANXIETY AND DEPRESSION: Status: ACTIVE | Noted: 2023-07-05

## 2023-07-05 PROBLEM — F32.A ANXIETY AND DEPRESSION: Status: ACTIVE | Noted: 2023-07-05

## 2023-07-05 PROBLEM — F22 PARANOIA (HCC): Status: ACTIVE | Noted: 2023-07-05

## 2023-07-05 PROBLEM — F19.10 SUBSTANCE ABUSE (HCC): Status: ACTIVE | Noted: 2023-07-05

## 2023-07-05 PROBLEM — Z65.8 PSYCHOSOCIAL STRESSORS: Status: ACTIVE | Noted: 2023-07-05

## 2023-07-05 PROBLEM — R45.851 PASSIVE SUICIDAL IDEATIONS: Status: ACTIVE | Noted: 2023-07-05

## 2023-07-05 RX ORDER — ONDANSETRON 4 MG/1
4 TABLET, ORALLY DISINTEGRATING ORAL EVERY 8 HOURS PRN
Status: DISCONTINUED | OUTPATIENT
Start: 2023-07-05 | End: 2023-07-05 | Stop reason: HOSPADM

## 2023-07-05 RX ORDER — LORAZEPAM 1 MG/1
1 TABLET ORAL 3 TIMES DAILY PRN
Status: DISCONTINUED | OUTPATIENT
Start: 2023-07-05 | End: 2023-07-05 | Stop reason: HOSPADM

## 2023-07-05 RX ORDER — ACETAMINOPHEN 325 MG/1
650 TABLET ORAL EVERY 8 HOURS PRN
Status: DISCONTINUED | OUTPATIENT
Start: 2023-07-05 | End: 2023-07-05 | Stop reason: HOSPADM

## 2023-07-05 RX ORDER — ZIPRASIDONE HYDROCHLORIDE 20 MG/1
20 CAPSULE ORAL NIGHTLY
Qty: 30 CAPSULE | Refills: 3 | Status: SHIPPED | OUTPATIENT
Start: 2023-07-05

## 2023-07-05 ASSESSMENT — PATIENT HEALTH QUESTIONNAIRE - PHQ9: SUM OF ALL RESPONSES TO PHQ QUESTIONS 1-9: 4

## 2023-07-05 ASSESSMENT — PAIN - FUNCTIONAL ASSESSMENT: PAIN_FUNCTIONAL_ASSESSMENT: NONE - DENIES PAIN

## 2023-07-05 ASSESSMENT — ANXIETY QUESTIONNAIRES
5. BEING SO RESTLESS THAT IT IS HARD TO SIT STILL: 1
1. FEELING NERVOUS, ANXIOUS, OR ON EDGE: 1
4. TROUBLE RELAXING: 1
GAD7 TOTAL SCORE: 10
2. NOT BEING ABLE TO STOP OR CONTROL WORRYING: 2
7. FEELING AFRAID AS IF SOMETHING AWFUL MIGHT HAPPEN: 1
3. WORRYING TOO MUCH ABOUT DIFFERENT THINGS: 2
6. BECOMING EASILY ANNOYED OR IRRITABLE: 2

## 2023-07-05 NOTE — ED NOTES
Handoff report received from [facility], [facility].      Bere Moore RN  07/05/23 8123

## 2023-07-05 NOTE — CONSULTS
I'm going to harm myself. I don't have the willpower or the strength to hurt myself. I've been having these thoughts for like a year. \" Patient denies suicidal plan or intent. He says he believes he would be safe outside the hospital setting. In terms of depressive symptoms he reports difficulty falling asleep and feelings of hopelessness and worthlessness. He reports that his appetite is normal and his energy level is fair. He states that his main concern is \"PTSD. \" When asked about PTSD symptoms, he denies flashbacks and nightmares; he then reports, \"It's not any of that, it's more like paranoia. I still have some thoughts because of my past, like people are following me. \" He denies having any thoughts of confronting others or worries about any particular individuals. We discussed that he was previously on Zyprexa while here in the emergency department, which can help with paranoia, mood, sleep, and anxiety. He says he would like to try this again because he recalls it being helpful. He notes that his \"jaw locked up\" in the past when he used Haldol. We discussed that this is a risk with Zyprexa as well but is much lower. We discuss that typically Klonopin is not used to treat PTSD and that we tend not to start this medication in the ED; discussed that he needs to follow up with outpatient psychiatry. He verbalizes understanding and says that he does think he should follow-up with outpatient mental health. We discussed that he is not currently meeting criteria for inpatient psychiatric hospitalization, and he expresses understanding. Patient denies homicidal ideation, intent, or plan. Denies auditory or visual hallucinations. No history of hypomania/clementina elicited on specific questioning. Patient is a 32 y.o. male who has a history of polysubstance use and substance-induced psychosis. He does not appear intoxicated at this time and is logical, linear, and future-oriented.  He reports recent depressive

## 2023-07-05 NOTE — ED NOTES
Patient resting at this time. Respirations are unlabored and even. No signs of distress noted. Safety precautions in place. Sitter at bedside.       Kenny Teran RN  07/05/23 3825

## 2023-07-05 NOTE — ED NOTES
I have reviewed discharge instructions with the patient. The patient verbalized understanding. Patient left ED via Discharge Method: ambulatory to Home with (self). Opportunity for questions and clarification provided. Patient given 1 scripts. To continue your aftercare when you leave the hospital, you may receive an automated call from our care team to check in on how you are doing. This is a free service and part of our promise to provide the best care and service to meet your aftercare needs. \" If you have questions, or wish to unsubscribe from this service please call [phone]. Thank you for Choosing our [facility] Emergency Department.         Omayra Bledsoe RN  07/05/23 6070

## 2023-07-05 NOTE — ED NOTES
[facility]      Mark Martin RN  07/05/23 6337

## 2023-07-05 NOTE — ED NOTES
Patient resting at this time. Respirations are unlabored and even. No signs of distress noted. Safety precautions in place. Sitter at bedside.       Olive Velazquez RN  07/05/23 7347

## 2023-07-05 NOTE — ED NOTES
Patient resting at this time. Respirations are unlabored and even. No signs of distress noted. Safety precautions in place. Sitter at bedside.       Gagandeep Castelan RN  07/05/23 7808

## 2023-07-05 NOTE — ED NOTES
Patient resting at this time. Respirations are unlabored and even. No signs of distress noted. Safety precautions in place. Sitter at bedside.       Katharine Galindo RN  07/05/23 5063

## 2023-07-05 NOTE — ED NOTES
Patient resting at this time. Respirations are unlabored and even. No signs of distress noted. Safety precautions in place. Sitter at bedside.       Zoila Brumfield RN  07/05/23 3775

## 2023-07-05 NOTE — ED NOTES
Patient resting at this time. Respirations are unlabored and even. No signs of distress noted. Safety precautions in place. Sitter at bedside.       Paul Suárez RN  07/05/23 3583

## 2023-07-05 NOTE — ED TRIAGE NOTES
Pt arrives via EMS. Per EMS pt is coming from [address] [facility], with paranoid thoughts of people following him. Pt wants help getting a bus ticket back to [place].

## 2023-07-05 NOTE — ED NOTES
Patient resting at this time. Respirations are unlabored and even. No signs of distress noted. Safety precautions in place. Sitter at bedside.       Zoila Brumfield RN  07/05/23 4263

## 2023-07-05 NOTE — ED PROVIDER NOTES
Daily psychiatric/behavioral health rounds for Wednesday, July 5, 2012 30 2 PM, chart reviewed. Patient seen yesterday with chief complaint of paranoid delusions patient has a history of polysubstance abuse malingering, he has been to 1 ER or another essentially every-other week since January 1. Recent stay at [facility] psychiatric evaluation over a few days resulted in 0 prospects of placement.   Patient wanting bus to get back to [place], will discuss with nurse  and psychiatry nurse practitioner on rounds    No current physical complaint, patient is not on involuntary commitment papers    2:49 PM  Patient declined for voluntary admission both by Saulo and MAYRA H, patient will have some Geodon written and wants to follow-up with the mental [place], patient asking for Klonopin which I explained we cannot prescribe for him    Will be discharged at this time     Yoly Spencer MD  07/05/23 6948

## 2023-07-05 NOTE — ED NOTES
Patient resting at this time. Respirations are unlabored and even. No signs of distress noted. Safety precautions in place. Sitter at bedside.       Kenny Teran RN  07/05/23 6977

## 2023-07-05 NOTE — ED NOTES
Patient resting at this time. Respirations are unlabored and even. No signs of distress noted. Safety precautions in place. Sitter at bedside.       Janet Hook RN  07/05/23 0698

## 2023-07-05 NOTE — CARE COORDINATION
Pt seen by In house psychiatry this am for assessment. Pt has requested voluntary in pt psychiatric care placement in the local area. Referrals sent to [facility] and [address] [place] [address] Drive. Await response. Per report pt is homeless. He was recently at [facility] but asked to leave for substance use. Last CM contact in ED was August 2022. Pt was also homeless at that time and was provided transport to local [address] [place] [address] for out pt assessment at his request.    1436:  [facility] Intake has reported that they are unable to offer pt a bed. [facility] also denied pt at this time. Psych NP and ED assigned MD updated.

## 2023-08-31 ENCOUNTER — HOSPITAL ENCOUNTER (EMERGENCY)
Age: 28
Discharge: HOME | End: 2023-09-01
Attending: EMERGENCY MEDICINE

## 2023-08-31 DIAGNOSIS — F15.10 METHAMPHETAMINE ABUSE (HCC): ICD-10-CM

## 2023-08-31 DIAGNOSIS — F22 DELUSIONS (HCC): ICD-10-CM

## 2023-08-31 DIAGNOSIS — F12.10 MARIJUANA ABUSE: ICD-10-CM

## 2023-08-31 DIAGNOSIS — R45.851 VERBALIZES SUICIDAL THOUGHTS: Primary | ICD-10-CM

## 2023-08-31 LAB
AMPHET UR QL SCN: POSITIVE
ANION GAP SERPL CALC-SCNC: 2 MMOL/L (ref 2–11)
APAP SERPL-MCNC: <2 UG/ML (ref 10–30)
BARBITURATES UR QL SCN: NEGATIVE
BASOPHILS # BLD: 0 K/UL (ref 0–0.2)
BASOPHILS NFR BLD: 1 % (ref 0–2)
BENZODIAZ UR QL: NEGATIVE
BUN SERPL-MCNC: 11 MG/DL (ref 6–23)
CALCIUM SERPL-MCNC: 8.8 MG/DL (ref 8.3–10.4)
CANNABINOIDS UR QL SCN: POSITIVE
CHLORIDE SERPL-SCNC: 105 MMOL/L (ref 101–110)
CO2 SERPL-SCNC: 32 MMOL/L (ref 21–32)
COCAINE UR QL SCN: NEGATIVE
CREAT SERPL-MCNC: 0.83 MG/DL (ref 0.8–1.5)
DIFFERENTIAL METHOD BLD: ABNORMAL
EOSINOPHIL # BLD: 0.1 K/UL (ref 0–0.8)
EOSINOPHIL NFR BLD: 1 % (ref 0.5–7.8)
ERYTHROCYTE [DISTWIDTH] IN BLOOD BY AUTOMATED COUNT: 14.8 % (ref 11.9–14.6)
ETHANOL SERPL-MCNC: <3 MG/DL (ref 0–0.08)
GLUCOSE SERPL-MCNC: 91 MG/DL (ref 65–100)
HCT VFR BLD AUTO: 43.7 % (ref 41.1–50.3)
HGB BLD-MCNC: 14.2 G/DL (ref 13.6–17.2)
IMM GRANULOCYTES # BLD AUTO: 0.1 K/UL (ref 0–0.5)
IMM GRANULOCYTES NFR BLD AUTO: 1 % (ref 0–5)
LYMPHOCYTES # BLD: 0.9 K/UL (ref 0.5–4.6)
LYMPHOCYTES NFR BLD: 14 % (ref 13–44)
MCH RBC QN AUTO: 28.5 PG (ref 26.1–32.9)
MCHC RBC AUTO-ENTMCNC: 32.5 G/DL (ref 31.4–35)
MCV RBC AUTO: 87.6 FL (ref 82–102)
METHADONE UR QL: NEGATIVE
MONOCYTES # BLD: 0.7 K/UL (ref 0.1–1.3)
MONOCYTES NFR BLD: 10 % (ref 4–12)
NEUTS SEG # BLD: 4.9 K/UL (ref 1.7–8.2)
NEUTS SEG NFR BLD: 74 % (ref 43–78)
NRBC # BLD: 0 K/UL (ref 0–0.2)
OPIATES UR QL: NEGATIVE
PCP UR QL: NEGATIVE
PLATELET # BLD AUTO: 305 K/UL (ref 150–450)
PMV BLD AUTO: 9.4 FL (ref 9.4–12.3)
POTASSIUM SERPL-SCNC: 3.8 MMOL/L (ref 3.5–5.1)
RBC # BLD AUTO: 4.99 M/UL (ref 4.23–5.6)
SALICYLATES SERPL-MCNC: 1.9 MG/DL (ref 2.8–20)
SODIUM SERPL-SCNC: 139 MMOL/L (ref 133–143)
WBC # BLD AUTO: 6.6 K/UL (ref 4.3–11.1)

## 2023-08-31 PROCEDURE — 80307 DRUG TEST PRSMV CHEM ANLYZR: CPT

## 2023-08-31 PROCEDURE — 82077 ASSAY SPEC XCP UR&BREATH IA: CPT

## 2023-08-31 PROCEDURE — 80179 DRUG ASSAY SALICYLATE: CPT

## 2023-08-31 PROCEDURE — 85025 COMPLETE CBC W/AUTO DIFF WBC: CPT

## 2023-08-31 PROCEDURE — 6370000000 HC RX 637 (ALT 250 FOR IP): Performed by: EMERGENCY MEDICINE

## 2023-08-31 PROCEDURE — 99283 EMERGENCY DEPT VISIT LOW MDM: CPT

## 2023-08-31 PROCEDURE — 80143 DRUG ASSAY ACETAMINOPHEN: CPT

## 2023-08-31 PROCEDURE — 80048 BASIC METABOLIC PNL TOTAL CA: CPT

## 2023-08-31 RX ORDER — OLANZAPINE 5 MG/1
10 TABLET, ORALLY DISINTEGRATING ORAL
Status: COMPLETED | OUTPATIENT
Start: 2023-08-31 | End: 2023-08-31

## 2023-08-31 RX ADMIN — OLANZAPINE 10 MG: 5 TABLET, ORALLY DISINTEGRATING ORAL at 17:24

## 2023-08-31 ASSESSMENT — LIFESTYLE VARIABLES
HOW MANY STANDARD DRINKS CONTAINING ALCOHOL DO YOU HAVE ON A TYPICAL DAY: PATIENT DOES NOT DRINK
HOW OFTEN DO YOU HAVE A DRINK CONTAINING ALCOHOL: NEVER
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
## 2023-08-31 NOTE — ED NOTES
Security personnel contacted and responded to ED to take patient's belonging and lock in safe. Patient provided wallet with all cards, ID and SS card, knife with hard case and keychain with lighter secured into security bag and sealed in front of patient.  Keila Mccullough transported security bag to office to lock in safe.       Eris Weldon RN  08/31/23 8526

## 2023-09-01 ENCOUNTER — HOSPITAL ENCOUNTER (EMERGENCY)
Age: 28
Discharge: HOME | End: 2023-09-01
Attending: EMERGENCY MEDICINE

## 2023-09-01 VITALS
RESPIRATION RATE: 14 BRPM | WEIGHT: 150 LBS | DIASTOLIC BLOOD PRESSURE: 65 MMHG | OXYGEN SATURATION: 99 % | HEART RATE: 88 BPM | SYSTOLIC BLOOD PRESSURE: 128 MMHG | HEIGHT: 74 IN | TEMPERATURE: 98.9 F | BODY MASS INDEX: 19.25 KG/M2

## 2023-09-01 VITALS
HEIGHT: 74 IN | HEART RATE: 79 BPM | SYSTOLIC BLOOD PRESSURE: 122 MMHG | WEIGHT: 150 LBS | RESPIRATION RATE: 18 BRPM | DIASTOLIC BLOOD PRESSURE: 92 MMHG | OXYGEN SATURATION: 99 % | BODY MASS INDEX: 19.25 KG/M2 | TEMPERATURE: 99.3 F

## 2023-09-01 DIAGNOSIS — F31.9 BIPOLAR 1 DISORDER (HCC): Primary | ICD-10-CM

## 2023-09-01 DIAGNOSIS — F19.10 SUBSTANCE ABUSE (HCC): ICD-10-CM

## 2023-09-01 PROBLEM — F12.10 MARIJUANA ABUSE: Status: ACTIVE | Noted: 2023-09-01

## 2023-09-01 PROBLEM — Z91.199 NONCOMPLIANCE: Status: ACTIVE | Noted: 2023-09-01

## 2023-09-01 PROBLEM — F15.10 METHAMPHETAMINE ABUSE (HCC): Status: ACTIVE | Noted: 2023-09-01

## 2023-09-01 PROBLEM — F19.94 SUBSTANCE INDUCED MOOD DISORDER (HCC): Status: ACTIVE | Noted: 2023-09-01

## 2023-09-01 PROCEDURE — 99283 EMERGENCY DEPT VISIT LOW MDM: CPT

## 2023-09-01 RX ORDER — OLANZAPINE 5 MG/1
2.5-5 TABLET ORAL NIGHTLY
Qty: 14 TABLET | Refills: 0 | Status: SHIPPED | OUTPATIENT
Start: 2023-09-01 | End: 2023-09-15

## 2023-09-01 RX ORDER — OLANZAPINE 10 MG/1
10 TABLET ORAL NIGHTLY
Qty: 14 TABLET | Refills: 0 | Status: SHIPPED | OUTPATIENT
Start: 2023-09-01 | End: 2023-09-01 | Stop reason: SDUPTHER

## 2023-09-01 ASSESSMENT — PATIENT HEALTH QUESTIONNAIRE - PHQ9
8. MOVING OR SPEAKING SO SLOWLY THAT OTHER PEOPLE COULD HAVE NOTICED. OR THE OPPOSITE, BEING SO FIGETY OR RESTLESS THAT YOU HAVE BEEN MOVING AROUND A LOT MORE THAN USUAL: 0
SUM OF ALL RESPONSES TO PHQ QUESTIONS 1-9: 6
3. TROUBLE FALLING OR STAYING ASLEEP: 1
9. THOUGHTS THAT YOU WOULD BE BETTER OFF DEAD, OR OF HURTING YOURSELF: 1
6. FEELING BAD ABOUT YOURSELF - OR THAT YOU ARE A FAILURE OR HAVE LET YOURSELF OR YOUR FAMILY DOWN: 1
4. FEELING TIRED OR HAVING LITTLE ENERGY: 1
5. POOR APPETITE OR OVEREATING: 0
SUM OF ALL RESPONSES TO PHQ QUESTIONS 1-9: 6
SUM OF ALL RESPONSES TO PHQ QUESTIONS 1-9: 6
SUM OF ALL RESPONSES TO PHQ QUESTIONS 1-9: 5
7. TROUBLE CONCENTRATING ON THINGS, SUCH AS READING THE NEWSPAPER OR WATCHING TELEVISION: 1
1. LITTLE INTEREST OR PLEASURE IN DOING THINGS: 0
2. FEELING DOWN, DEPRESSED OR HOPELESS: 1
SUM OF ALL RESPONSES TO PHQ9 QUESTIONS 1 & 2: 1

## 2023-09-01 ASSESSMENT — ENCOUNTER SYMPTOMS
VOMITING: 0
ABDOMINAL PAIN: 0

## 2023-09-01 ASSESSMENT — PAIN SCALES - GENERAL: PAINLEVEL_OUTOF10: 0

## 2023-09-01 ASSESSMENT — LIFESTYLE VARIABLES
HOW OFTEN DO YOU HAVE A DRINK CONTAINING ALCOHOL: MONTHLY OR LESS
HOW MANY STANDARD DRINKS CONTAINING ALCOHOL DO YOU HAVE ON A TYPICAL DAY: 1 OR 2

## 2023-09-01 ASSESSMENT — PAIN - FUNCTIONAL ASSESSMENT: PAIN_FUNCTIONAL_ASSESSMENT: 0-10
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
## 2023-09-01 NOTE — ED PROVIDER NOTES
Emergency Department Provider Note       PCP: On File Not (Inactive)   Age: 29 y.o. Sex: male     DISPOSITION Decision To Discharge 09/01/2023 03:34:40 PM       ICD-10-CM    1. Bipolar 1 disorder (HCC)  F31.9       2. Substance abuse [facility])  F19.10           Medical Decision Making   Long-term issues with substance abuse and possibly bipolar disorder as well. No commitment criteria met. We will have patient call the other resources he was given. If no long-term facility is available, we will make arrangements for [employer]. May have less sedation if he decrease Zyprexa to 5 mg, half to 1 tab nightly. Complexity of Problems Addressed:  1 or more chronic illnesses with a severe exacerbation or progression. Data Reviewed and Analyzed:   I independently ordered and reviewed each unique test.  I reviewed external records: ED visit note from an outside group. Other emergency department visits within the last week. Was evaluated and felt not to require admission for psychiatric issues. Discussion of management or test interpretation. Situation route really changed with this visit compared to prior visits. Risk of Complications and/or Morbidity of Patient Management:  Parental controlled substances given in the ED. Chronic medical problems impacting care include bipolar disorder. Diagnosis or care significantly limited by social determinants of health substance abuse. History      Amirah Felix is a 29 y.o. male who presents to the Emergency Department with chief complaint of    Chief Complaint   Patient presents with    Mental Health Problem     Pt just left this morning and states that he had to pay at the facility and could not afford it. 70-year-old male comes in stating that he needs help with his bipolar disorder. Patient states he was here earlier today and was given [facility] ride [facility]. He stated they had beds but would not let him in.   He stated this was

## 2023-09-01 NOTE — CARE COORDINATION
Patient in the ER voluntarily. Is current with [facility] and has a confirmed upcoming appointment. Evaluated by psych NP and appropriate for discharge. Patient is homeless. JACQUI provided a directory of residential recovery options and advised him that [facility] may have the best availability. Patient also provided with contact information for [facility] and urged to contact someone there to assist with placement. SW offered the patient a ride to a location of his choosing at discharge. Records from this ER visit sent to [facility] for continuity of care. Patient states that he has a bed a place at [facility]. [employer] arranged to transport patient.     Miguel Parry LMSW    [facility]

## 2023-09-01 NOTE — DISCHARGE INSTRUCTIONS
Consider taking the Zyprexa at a lower dose, half to 1 pill of the 5 mg nightly. That should cause less sleepiness during the daytime.
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
## 2023-09-01 NOTE — ED TRIAGE NOTES
Pt advises that he was seen today in the ED. Pt advises that he was unable to get into [facility] or use any other resources that we provided. Pt agitated during triage. Pt states he is unable to take the meds that were prescribed to him earlier because they make him too tired.

## 2023-09-01 NOTE — ED NOTES
Patient given discharge paperwork and SW gave him information on rehab and safe houses. Pt has verbalized understnding.     Pt requested ride to amairani lakhani, JACQUI aware     Dev Wynn, [place]  09/01/23 6206

## 2023-09-30 ENCOUNTER — HOSPITAL ENCOUNTER (EMERGENCY)
Age: 28
Discharge: HOME | End: 2023-10-01
Attending: EMERGENCY MEDICINE

## 2023-09-30 DIAGNOSIS — R45.851 SUICIDAL IDEATION: Primary | ICD-10-CM

## 2023-09-30 DIAGNOSIS — F19.10 POLYSUBSTANCE ABUSE (HCC): ICD-10-CM

## 2023-09-30 DIAGNOSIS — Z76.5 MALINGERING: ICD-10-CM

## 2023-09-30 PROCEDURE — 80053 COMPREHEN METABOLIC PANEL: CPT

## 2023-09-30 PROCEDURE — 82077 ASSAY SPEC XCP UR&BREATH IA: CPT

## 2023-09-30 PROCEDURE — 85025 COMPLETE CBC W/AUTO DIFF WBC: CPT

## 2023-09-30 PROCEDURE — 80307 DRUG TEST PRSMV CHEM ANLYZR: CPT

## 2023-09-30 PROCEDURE — 83735 ASSAY OF MAGNESIUM: CPT

## 2023-09-30 PROCEDURE — 99285 EMERGENCY DEPT VISIT HI MDM: CPT

## 2023-09-30 PROCEDURE — 80143 DRUG ASSAY ACETAMINOPHEN: CPT

## 2023-09-30 PROCEDURE — 80179 DRUG ASSAY SALICYLATE: CPT

## 2023-09-30 ASSESSMENT — LIFESTYLE VARIABLES
HOW MANY STANDARD DRINKS CONTAINING ALCOHOL DO YOU HAVE ON A TYPICAL DAY: 1 OR 2
HOW OFTEN DO YOU HAVE A DRINK CONTAINING ALCOHOL: MONTHLY OR LESS

## 2023-10-01 VITALS
WEIGHT: 150 LBS | HEIGHT: 74 IN | DIASTOLIC BLOOD PRESSURE: 58 MMHG | OXYGEN SATURATION: 98 % | HEART RATE: 58 BPM | TEMPERATURE: 98.3 F | BODY MASS INDEX: 19.25 KG/M2 | RESPIRATION RATE: 16 BRPM | SYSTOLIC BLOOD PRESSURE: 91 MMHG

## 2023-10-01 LAB
ALBUMIN SERPL-MCNC: 3.4 G/DL (ref 3.5–5)
ALBUMIN/GLOB SERPL: 1 (ref 0.4–1.6)
ALP SERPL-CCNC: 57 U/L (ref 50–136)
ALT SERPL-CCNC: 21 U/L (ref 12–65)
AMPHET UR QL SCN: POSITIVE
ANION GAP SERPL CALC-SCNC: 8 MMOL/L (ref 2–11)
APAP SERPL-MCNC: <2 UG/ML (ref 10–30)
AST SERPL-CCNC: 18 U/L (ref 15–37)
BARBITURATES UR QL SCN: NEGATIVE
BASOPHILS # BLD: 0.1 K/UL (ref 0–0.2)
BASOPHILS NFR BLD: 1 % (ref 0–2)
BENZODIAZ UR QL: NEGATIVE
BILIRUB SERPL-MCNC: 0.3 MG/DL (ref 0.2–1.1)
BUN SERPL-MCNC: 12 MG/DL (ref 6–23)
CALCIUM SERPL-MCNC: 8.6 MG/DL (ref 8.3–10.4)
CANNABINOIDS UR QL SCN: POSITIVE
CHLORIDE SERPL-SCNC: 108 MMOL/L (ref 101–110)
CO2 SERPL-SCNC: 24 MMOL/L (ref 21–32)
COCAINE UR QL SCN: NEGATIVE
CREAT SERPL-MCNC: 0.81 MG/DL (ref 0.8–1.5)
DIFFERENTIAL METHOD BLD: ABNORMAL
EOSINOPHIL # BLD: 0.2 K/UL (ref 0–0.8)
EOSINOPHIL NFR BLD: 3 % (ref 0.5–7.8)
ERYTHROCYTE [DISTWIDTH] IN BLOOD BY AUTOMATED COUNT: 14.1 % (ref 11.9–14.6)
ETHANOL SERPL-MCNC: <3 MG/DL (ref 0–0.08)
GLOBULIN SER CALC-MCNC: 3.3 G/DL (ref 2.8–4.5)
GLUCOSE SERPL-MCNC: 97 MG/DL (ref 65–100)
HCT VFR BLD AUTO: 35.3 % (ref 41.1–50.3)
HGB BLD-MCNC: 11.8 G/DL (ref 13.6–17.2)
IMM GRANULOCYTES # BLD AUTO: 0.1 K/UL (ref 0–0.5)
IMM GRANULOCYTES NFR BLD AUTO: 1 % (ref 0–5)
LYMPHOCYTES # BLD: 2.8 K/UL (ref 0.5–4.6)
LYMPHOCYTES NFR BLD: 40 % (ref 13–44)
MAGNESIUM SERPL-MCNC: 1.7 MG/DL (ref 1.8–2.4)
MCH RBC QN AUTO: 28.5 PG (ref 26.1–32.9)
MCHC RBC AUTO-ENTMCNC: 33.4 G/DL (ref 31.4–35)
MCV RBC AUTO: 85.3 FL (ref 82–102)
METHADONE UR QL: NEGATIVE
MONOCYTES # BLD: 1 K/UL (ref 0.1–1.3)
MONOCYTES NFR BLD: 15 % (ref 4–12)
NEUTS SEG # BLD: 2.8 K/UL (ref 1.7–8.2)
NEUTS SEG NFR BLD: 40 % (ref 43–78)
NRBC # BLD: 0 K/UL (ref 0–0.2)
OPIATES UR QL: NEGATIVE
PCP UR QL: NEGATIVE
PLATELET # BLD AUTO: 259 K/UL (ref 150–450)
PMV BLD AUTO: 9.9 FL (ref 9.4–12.3)
POTASSIUM SERPL-SCNC: 4 MMOL/L (ref 3.5–5.1)
PROT SERPL-MCNC: 6.7 G/DL (ref 6.3–8.2)
RBC # BLD AUTO: 4.14 M/UL (ref 4.23–5.6)
SALICYLATES SERPL-MCNC: <1.7 MG/DL (ref 2.8–20)
SODIUM SERPL-SCNC: 140 MMOL/L (ref 133–143)
WBC # BLD AUTO: 7 K/UL (ref 4.3–11.1)

## 2023-10-01 NOTE — ED TRIAGE NOTES
Pt brought in by ems c/o of depression/   Pt recently seen at 2 different hospitals/ pt wanted a med refill  but when arrived in ER stated he was SI/ pt estranged family in ER at this time at stated \"he just wants a place to sleep\"

## 2023-10-01 NOTE — ED PROVIDER NOTES
Emergency Department Provider Note       PCP: On File Not (Inactive)   Age: 29 y.o. Sex: male     [address]    1. Suicidal ideation  R45.024           Medical Decision Making     Complexity of Problems Addressed:  Complexity of Problem: 1 acute, uncomplicated illness or injury. Data Reviewed and Analyzed:  I independently ordered and reviewed each unique test.  I reviewed external records: ED visit note from an outside group. I reviewed external records: provider visit note from outside specialist.         Discussion of management or test interpretation. 19-year-old male with history of polysubstance abuse and underlying schizophrenia, anxiety and depression presenting with suicidal ideation. Has been seen on numerous occasions for this. In the past month he has had over 10 visits between various ERs in the area. He was just evaluated by psychiatry at [facility] ED 4 days ago and was determined to be stable for discharge and was provided with information regarding [facility] and local homeless shelters. Patient is presenting today with similar complaints. Does not appear to be acutely intoxicated. Psych consult placed, pending at time of shift change. Patient signed out to attending Dr. Franklin Gutierrez who will follow up on this. Risk of Complications and/or Morbidity of Patient Management:  Shared medical decision making was utilized in creating the patients health plan today. History      19-year-old male with history of anxiety and depression, polysubstance abuse, schizophrenia, and housing instability presents to the emergency department today for complaints of suicidal ideation. Reports having thoughts of harming himself and when asked of a plan he states that he would shoot himself. He states that he does not own a gun or have access to a gun. He does have a knife but does not endorse a plan to use this. He is also reporting concerns of \"people following me\".   He states this

## 2023-10-01 NOTE — ED NOTES
MD notified that patient said he was going to shoot himself and possible others to get attention.       Mariama Helton RN  10/01/23 5083

## 2023-10-01 NOTE — ED NOTES
Tried to talk with patient and he was very tired and did not want to talk. Kept covering his head up with a blanket.       Cassidy Yao RN  10/01/23 6962

## 2023-10-01 NOTE — ED NOTES
Faraz Iraheta at [facility], [address] [place] [address]  10/01/23 5790

## 2023-10-01 NOTE — DISCHARGE INSTRUCTIONS
[facility]   [phone]   They have multiple resources to help you. Please call.  [email or web address]. [email or web address]     Other local resources that are available are: The [address] [facility]. [facility] for inpatient and outpatient substance abuse issues. [address] [place] [address] [phone]  Medication assisted treatment    [address] [facility]  [phone]     Suicide Hotline   5-030-TFNYPCX     Narcotics Anonymous   www.na. org  Alcoholics Anonymous  www.aa.org

## 2023-10-01 NOTE — ED NOTES
Pt urine and blood obtained per order, pt belongings, removed, pt in paper scrubs, pt wanded per security, security at bedside,         Steven Avery RN  10/01/23 0005

## 2023-10-05 ENCOUNTER — HOSPITAL ENCOUNTER (EMERGENCY)
Age: 28
Discharge: HOME | End: 2023-10-05
Attending: EMERGENCY MEDICINE

## 2023-10-05 VITALS
HEART RATE: 91 BPM | OXYGEN SATURATION: 100 % | DIASTOLIC BLOOD PRESSURE: 75 MMHG | BODY MASS INDEX: 17.97 KG/M2 | RESPIRATION RATE: 18 BRPM | TEMPERATURE: 98.2 F | SYSTOLIC BLOOD PRESSURE: 120 MMHG | HEIGHT: 74 IN | WEIGHT: 140 LBS

## 2023-10-05 DIAGNOSIS — Z59.00 HOMELESSNESS: Primary | ICD-10-CM

## 2023-10-05 PROCEDURE — 99285 EMERGENCY DEPT VISIT HI MDM: CPT

## 2023-10-05 PROCEDURE — 99283 EMERGENCY DEPT VISIT LOW MDM: CPT

## 2023-10-05 SDOH — ECONOMIC STABILITY - HOUSING INSECURITY: HOMELESSNESS UNSPECIFIED: Z59.00

## 2023-10-05 ASSESSMENT — PAIN SCALES - GENERAL: PAINLEVEL_OUTOF10: 0

## 2023-10-05 ASSESSMENT — LIFESTYLE VARIABLES
HOW MANY STANDARD DRINKS CONTAINING ALCOHOL DO YOU HAVE ON A TYPICAL DAY: 3 OR 4
HOW OFTEN DO YOU HAVE A DRINK CONTAINING ALCOHOL: 2-4 TIMES A MONTH

## 2023-10-05 ASSESSMENT — PAIN - FUNCTIONAL ASSESSMENT: PAIN_FUNCTIONAL_ASSESSMENT: 0-10
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
## 2023-10-05 NOTE — ED TRIAGE NOTES
Pt BIB EMS for c/o of hallucinations and SI. Pt reportedly feels like someone is following him. Reports he was going to jump into traffic or find a gun to shoot himself. VSS per EMS.  Pt was picked up at the [facility] outside of [place].

## 2023-10-05 NOTE — DISCHARGE INSTRUCTIONS
Please follow-up with the resources given to you at your discharge from [facility]. Continue to work with the homeless shelters in the area.

## 2023-10-05 NOTE — ED NOTES
Pt in bed resting with eyes closed at this time. Respirations even and unlabored. Pt denies any needs/wants at this time. Monitoring ongoing.       Nate Chahal RN  10/05/23 9407

## 2023-10-12 ENCOUNTER — HOSPITAL ENCOUNTER (EMERGENCY)
Age: 28
Discharge: HOME | End: 2023-10-13
Attending: EMERGENCY MEDICINE

## 2023-10-12 DIAGNOSIS — R45.851 SUICIDAL IDEATION: Primary | ICD-10-CM

## 2023-10-12 LAB
ALBUMIN SERPL-MCNC: 3.3 G/DL (ref 3.5–5)
ALBUMIN/GLOB SERPL: 1 (ref 0.4–1.6)
ALP SERPL-CCNC: 62 U/L (ref 50–136)
ALT SERPL-CCNC: 25 U/L (ref 12–65)
AMPHET UR QL SCN: POSITIVE
ANION GAP SERPL CALC-SCNC: 7 MMOL/L (ref 2–11)
APAP SERPL-MCNC: <2 UG/ML (ref 10–30)
APPEARANCE UR: CLEAR
AST SERPL-CCNC: 34 U/L (ref 15–37)
BACTERIA URNS QL MICRO: NEGATIVE /HPF
BARBITURATES UR QL SCN: NEGATIVE
BASOPHILS # BLD: 0 K/UL (ref 0–0.2)
BASOPHILS NFR BLD: 1 % (ref 0–2)
BENZODIAZ UR QL: NEGATIVE
BILIRUB SERPL-MCNC: 0.4 MG/DL (ref 0.2–1.1)
BILIRUB UR QL: NEGATIVE
BUN SERPL-MCNC: 15 MG/DL (ref 6–23)
CALCIUM SERPL-MCNC: 8.6 MG/DL (ref 8.3–10.4)
CANNABINOIDS UR QL SCN: POSITIVE
CASTS URNS QL MICRO: ABNORMAL /LPF
CHLORIDE SERPL-SCNC: 108 MMOL/L (ref 101–110)
CO2 SERPL-SCNC: 29 MMOL/L (ref 21–32)
COCAINE UR QL SCN: NEGATIVE
COLOR UR: ABNORMAL
CREAT SERPL-MCNC: 0.9 MG/DL (ref 0.8–1.5)
DIFFERENTIAL METHOD BLD: ABNORMAL
EOSINOPHIL # BLD: 0.1 K/UL (ref 0–0.8)
EOSINOPHIL NFR BLD: 2 % (ref 0.5–7.8)
EPI CELLS #/AREA URNS HPF: ABNORMAL /HPF
ERYTHROCYTE [DISTWIDTH] IN BLOOD BY AUTOMATED COUNT: 14.9 % (ref 11.9–14.6)
ETHANOL SERPL-MCNC: <3 MG/DL (ref 0–0.08)
GLOBULIN SER CALC-MCNC: 3.4 G/DL (ref 2.8–4.5)
GLUCOSE SERPL-MCNC: 77 MG/DL (ref 65–100)
GLUCOSE UR STRIP.AUTO-MCNC: NEGATIVE MG/DL
HCT VFR BLD AUTO: 37.9 % (ref 41.1–50.3)
HGB BLD-MCNC: 12.3 G/DL (ref 13.6–17.2)
HGB UR QL STRIP: ABNORMAL
IMM GRANULOCYTES # BLD AUTO: 0 K/UL (ref 0–0.5)
IMM GRANULOCYTES NFR BLD AUTO: 0 % (ref 0–5)
KETONES UR QL STRIP.AUTO: NEGATIVE MG/DL
LEUKOCYTE ESTERASE UR QL STRIP.AUTO: ABNORMAL
LYMPHOCYTES # BLD: 2.5 K/UL (ref 0.5–4.6)
LYMPHOCYTES NFR BLD: 28 % (ref 13–44)
MCH RBC QN AUTO: 28.5 PG (ref 26.1–32.9)
MCHC RBC AUTO-ENTMCNC: 32.5 G/DL (ref 31.4–35)
MCV RBC AUTO: 87.7 FL (ref 82–102)
METHADONE UR QL: NEGATIVE
MONOCYTES # BLD: 1.1 K/UL (ref 0.1–1.3)
MONOCYTES NFR BLD: 12 % (ref 4–12)
NEUTS SEG # BLD: 5.1 K/UL (ref 1.7–8.2)
NEUTS SEG NFR BLD: 57 % (ref 43–78)
NITRITE UR QL STRIP.AUTO: NEGATIVE
NRBC # BLD: 0.02 K/UL (ref 0–0.2)
OPIATES UR QL: NEGATIVE
PCP UR QL: NEGATIVE
PH UR STRIP: 5.5 (ref 5–9)
PLATELET # BLD AUTO: 280 K/UL (ref 150–450)
PMV BLD AUTO: 9.5 FL (ref 9.4–12.3)
POTASSIUM SERPL-SCNC: 3.5 MMOL/L (ref 3.5–5.1)
PROT SERPL-MCNC: 6.7 G/DL (ref 6.3–8.2)
PROT UR STRIP-MCNC: ABNORMAL MG/DL
RBC # BLD AUTO: 4.32 M/UL (ref 4.23–5.6)
RBC #/AREA URNS HPF: ABNORMAL /HPF
SALICYLATES SERPL-MCNC: <1.7 MG/DL (ref 2.8–20)
SARS-COV-2 RDRP RESP QL NAA+PROBE: NOT DETECTED
SODIUM SERPL-SCNC: 144 MMOL/L (ref 133–143)
SOURCE: NORMAL
SP GR UR REFRACTOMETRY: 1.02 (ref 1–1.02)
UROBILINOGEN UR QL STRIP.AUTO: 1 EU/DL (ref 0.2–1)
WBC # BLD AUTO: 8.8 K/UL (ref 4.3–11.1)
WBC URNS QL MICRO: ABNORMAL /HPF

## 2023-10-12 PROCEDURE — 81001 URINALYSIS AUTO W/SCOPE: CPT

## 2023-10-12 PROCEDURE — 87635 SARS-COV-2 COVID-19 AMP PRB: CPT

## 2023-10-12 PROCEDURE — 82077 ASSAY SPEC XCP UR&BREATH IA: CPT

## 2023-10-12 PROCEDURE — 80179 DRUG ASSAY SALICYLATE: CPT

## 2023-10-12 PROCEDURE — 6370000000 HC RX 637 (ALT 250 FOR IP): Performed by: EMERGENCY MEDICINE

## 2023-10-12 PROCEDURE — 80053 COMPREHEN METABOLIC PANEL: CPT

## 2023-10-12 PROCEDURE — 80143 DRUG ASSAY ACETAMINOPHEN: CPT

## 2023-10-12 PROCEDURE — 80307 DRUG TEST PRSMV CHEM ANLYZR: CPT

## 2023-10-12 PROCEDURE — 99285 EMERGENCY DEPT VISIT HI MDM: CPT

## 2023-10-12 PROCEDURE — 85025 COMPLETE CBC W/AUTO DIFF WBC: CPT

## 2023-10-12 RX ORDER — SULFAMETHOXAZOLE AND TRIMETHOPRIM 800; 160 MG/1; MG/1
1 TABLET ORAL
Status: COMPLETED | OUTPATIENT
Start: 2023-10-12 | End: 2023-10-12

## 2023-10-12 RX ADMIN — SULFAMETHOXAZOLE AND TRIMETHOPRIM 1 TABLET: 800; 160 TABLET ORAL at 21:02

## 2023-10-12 ASSESSMENT — LIFESTYLE VARIABLES
HOW MANY STANDARD DRINKS CONTAINING ALCOHOL DO YOU HAVE ON A TYPICAL DAY: 1 OR 2
HOW OFTEN DO YOU HAVE A DRINK CONTAINING ALCOHOL: 2-4 TIMES A MONTH

## 2023-10-12 ASSESSMENT — PAIN DESCRIPTION - ORIENTATION: ORIENTATION: LEFT;UPPER;LOWER

## 2023-10-12 ASSESSMENT — PAIN - FUNCTIONAL ASSESSMENT: PAIN_FUNCTIONAL_ASSESSMENT: 0-10

## 2023-10-12 ASSESSMENT — PAIN DESCRIPTION - LOCATION: LOCATION: TEETH;MOUTH

## 2023-10-12 ASSESSMENT — PAIN DESCRIPTION - DESCRIPTORS: DESCRIPTORS: ACHING

## 2023-10-12 ASSESSMENT — PAIN SCALES - GENERAL: PAINLEVEL_OUTOF10: 8

## 2023-10-12 NOTE — ED TRIAGE NOTES
Pt arrives ambulatory to ed with c/o left upper and lower dental pain x 2 weeks. Pt states he wants to hurt himself but has no plan and wants to come up with one. Pt went to [facility] but could not get help until Monday. Pt states he feels unsafe about himself and he \"keeps doing everything hes supposed to do but its not working\". Triage performed by Daphney Saucedo RN.

## 2023-10-12 NOTE — ED NOTES
Patient and belongings wanded by security at this time. Patient placed in paper scrubs. Belongings secured behind door.       Olivia Escalante RN  10/12/23 1946

## 2023-10-12 NOTE — ED NOTES
Patient reports he has an appointment scheduled with [address] [place] [address] at [place], [place]  10/12/23 1947

## 2023-10-13 VITALS
OXYGEN SATURATION: 99 % | HEART RATE: 60 BPM | BODY MASS INDEX: 16.68 KG/M2 | SYSTOLIC BLOOD PRESSURE: 102 MMHG | HEIGHT: 74 IN | TEMPERATURE: 98.5 F | WEIGHT: 130 LBS | DIASTOLIC BLOOD PRESSURE: 56 MMHG | RESPIRATION RATE: 16 BRPM

## 2023-10-13 RX ORDER — SULFAMETHOXAZOLE AND TRIMETHOPRIM 800; 160 MG/1; MG/1
1 TABLET ORAL EVERY 12 HOURS SCHEDULED
Status: DISCONTINUED | OUTPATIENT
Start: 2023-10-13 | End: 2023-10-13 | Stop reason: HOSPADM

## 2023-10-13 RX ORDER — ONDANSETRON 4 MG/1
4 TABLET, ORALLY DISINTEGRATING ORAL EVERY 8 HOURS PRN
Status: DISCONTINUED | OUTPATIENT
Start: 2023-10-13 | End: 2023-10-13 | Stop reason: HOSPADM

## 2023-10-13 RX ORDER — HYDROXYZINE HYDROCHLORIDE 25 MG/1
25 TABLET, FILM COATED ORAL EVERY 6 HOURS PRN
Status: DISCONTINUED | OUTPATIENT
Start: 2023-10-13 | End: 2023-10-13 | Stop reason: HOSPADM

## 2023-10-13 NOTE — ED PROVIDER NOTES
Patient signed out to me pending further observational time and assessment by telepsychiatry with possible placement, patient previously placed on involuntary commitment papers. Patient evaluated by telepsychiatry who noted the patient forward thinking, desires to go to outpatient mental health appointment later on this morning, has chronic SI but no current plan. I spoke with the patient individually who states he is generally feeling better than on initial intake, verify details of telepsychiatry including the fact that he does have an appointment later on today, wishes to go to that appointment, does not have any active plan on wanting to hurt himself. We discussed the patient may return at any time for reevaluation, help in further management and I strongly encouraged him to do so. Patient comfortable and in agreement with current plan for outpatient mental health management with instructions return as needed for any questions concerns or worsening symptoms. Please see telepsychiatry documentation for additional information regarding their consultation. Diagnosis/impression:  1.   Suicidal ideation      Disposition: Discharged     Adin Mancilla MD  10/13/23 1837
0.2 - 1.0 EU/dL    Nitrite, Urine Negative NEG      Leukocyte Esterase, Urine SMALL (A) NEG      WBC, UA 20-50 (A) U4 /hpf    RBC, UA 5-10 (A) U5 /hpf    Epithelial Cells UA 0-5 U5 /hpf    BACTERIA, URINE Negative NEG /hpf    Casts 0-2 U2 /lpf   Urine Drug Screen   Result Value Ref Range    PCP, Urine Negative NEG      Benzodiazepines, Urine Negative NEG      Cocaine, Urine Negative NEG      Amphetamine, Urine Positive (A) NEG      Methadone, Urine Negative NEG      THC, TH-Cannabinol, Urine Positive (A) NEG      Opiates, Urine Negative NEG      Barbiturates, Urine Negative NEG     ETOH   Result Value Ref Range    Ethanol Lvl <3 MG/DL   Inpatient consult to Psychiatry    Narrative    Raghu Maxwell     10/12/2023  7:34 PM  Arranged consult with Tele Psychiatry via web site. Consult ID:   [id number]        No orders to display                     Voice dictation software was used during the making of this note. This software is not perfect and grammatical and other typographical errors may be present. This note has not been completely proofread for errors.        Jermain Martinez, [address] [place] [address]  10/13/23 9224

## 2023-10-13 NOTE — ED NOTES
Patient resting at this time. Respirations are unlabored and even. No signs of distress noted. Safety precautions in place. Sitter at bedside.           Vaibhav Martin RN  10/13/23 8636

## 2023-10-13 NOTE — ED NOTES
Patient resting at this time. Respirations are unlabored and even. No signs of distress noted. Safety precautions in place. Sitter at bedside.           Ismael Law RN  10/12/23 2673

## 2023-10-13 NOTE — ED NOTES
Patient resting at this time. Respirations are unlabored and even. No signs of distress noted. Safety precautions in place. Sitter at bedside.           Cande Sinha RN  10/13/23 6255

## 2023-10-13 NOTE — DISCHARGE INSTRUCTIONS
Return as needed for any questions concerns or worsening symptoms, particular increasing/unremitting thoughts of wanting to harm yourself, actions of self-harm.

## 2023-10-13 NOTE — ED NOTES
Patient resting at this time. Respirations are unlabored and even. No signs of distress noted. Safety precautions in place. Sitter at bedside.           Derrek Mallory RN  10/13/23 4336 Paty Parham was seen 02/06/2018 for an audiological evaluation.  Patient complains of vertigo since last Sunday.  She reports that she was diagnosed with Meniere's disease 5 years ago.  Her last episode was 3 years ago.  She declined Marissa Hallpike maneuver. She underwent videonystagmography at Dr. Duncan Cedillo's office 3 years ago in which a unilateral weakness was diagnosed in her left ear.  She complains of fullness in her left ear today.    Results reveal normal hearing sensitivity 250-8000 Hz bilaterally.  Speech Reception Thresholds were  5 dBHL for the right ear and 0 dBHL for the left ear.   Word recognition scores were excellent bilaterally.  Tympanograms were Type A for the right ear and Type A for the left ear.    Patient was counseled on the above findings.    REC:  ENT consult

## 2023-10-13 NOTE — ED NOTES
Patient resting at this time. Respirations are unlabored and even. No signs of distress noted. Safety precautions in place. Sitter at bedside.          Nelly Gonzalez RN  10/13/23 0000

## 2023-10-13 NOTE — ED NOTES
Patient resting at this time. Respirations are unlabored and even. No signs of distress noted. Safety precautions in place. Sitter at bedside.           Jamir Farrell RN  10/13/23 0675

## 2023-10-13 NOTE — ED NOTES
Patient resting at this time. Respirations are unlabored and even. No signs of distress noted. Safety precautions in place. Sitter at bedside.           Nelly Gonzalez RN  10/12/23 7739

## 2023-10-13 NOTE — ED NOTES
Patient resting at this time. Respirations are unlabored and even. No signs of distress noted. Safety precautions in place. Sitter at bedside.           Klever Beatty RN  10/13/23 1594

## 2023-10-13 NOTE — ED NOTES
Patient resting at this time. Respirations are unlabored and even. No signs of distress noted. Safety precautions in place. Sitter at bedside.           Lis Payan RN  10/13/23 1770

## 2023-10-13 NOTE — ED NOTES
Patient resting at this time. Respirations are unlabored and even. No signs of distress noted. Safety precautions in place. Sitter at bedside.           Raysa Curtis RN  10/12/23 5088

## 2023-10-14 ENCOUNTER — HOSPITAL ENCOUNTER (EMERGENCY)
Age: 28
Discharge: ANOTHER HOSPITAL | End: 2023-10-15
Attending: EMERGENCY MEDICINE

## 2023-10-14 VITALS
TEMPERATURE: 98.3 F | HEIGHT: 74 IN | RESPIRATION RATE: 14 BRPM | BODY MASS INDEX: 16.68 KG/M2 | SYSTOLIC BLOOD PRESSURE: 127 MMHG | OXYGEN SATURATION: 100 % | WEIGHT: 130 LBS | DIASTOLIC BLOOD PRESSURE: 81 MMHG | HEART RATE: 77 BPM

## 2023-10-14 DIAGNOSIS — R45.851 SUICIDAL IDEATION: Primary | ICD-10-CM

## 2023-10-14 DIAGNOSIS — F19.10 POLYSUBSTANCE ABUSE (HCC): ICD-10-CM

## 2023-10-14 PROCEDURE — 80307 DRUG TEST PRSMV CHEM ANLYZR: CPT

## 2023-10-14 PROCEDURE — 99285 EMERGENCY DEPT VISIT HI MDM: CPT

## 2023-10-14 ASSESSMENT — LIFESTYLE VARIABLES
HOW OFTEN DO YOU HAVE A DRINK CONTAINING ALCOHOL: 2-4 TIMES A MONTH
HOW MANY STANDARD DRINKS CONTAINING ALCOHOL DO YOU HAVE ON A TYPICAL DAY: 1 OR 2

## 2023-10-14 ASSESSMENT — PAIN DESCRIPTION - LOCATION: LOCATION: MOUTH

## 2023-10-14 ASSESSMENT — PAIN - FUNCTIONAL ASSESSMENT: PAIN_FUNCTIONAL_ASSESSMENT: 0-10

## 2023-10-14 ASSESSMENT — PAIN SCALES - GENERAL: PAINLEVEL_OUTOF10: 7

## 2023-10-14 ASSESSMENT — PAIN DESCRIPTION - ORIENTATION: ORIENTATION: LEFT

## 2023-10-15 ENCOUNTER — HOSPITAL ENCOUNTER (EMERGENCY)
Age: 28
Discharge: HOME | End: 2023-10-16
Attending: EMERGENCY MEDICINE

## 2023-10-15 DIAGNOSIS — F19.10 POLYSUBSTANCE ABUSE (HCC): ICD-10-CM

## 2023-10-15 DIAGNOSIS — Z59.819 HOUSING INSTABILITY: Primary | ICD-10-CM

## 2023-10-15 PROCEDURE — 6370000000 HC RX 637 (ALT 250 FOR IP): Performed by: EMERGENCY MEDICINE

## 2023-10-15 PROCEDURE — 99285 EMERGENCY DEPT VISIT HI MDM: CPT

## 2023-10-15 PROCEDURE — 6370000000 HC RX 637 (ALT 250 FOR IP): Performed by: PHYSICIAN ASSISTANT

## 2023-10-15 RX ORDER — OLANZAPINE 5 MG/1
5 TABLET, ORALLY DISINTEGRATING ORAL NIGHTLY
Status: DISCONTINUED | OUTPATIENT
Start: 2023-10-15 | End: 2023-10-16 | Stop reason: HOSPADM

## 2023-10-15 RX ORDER — OLANZAPINE 5 MG/1
5 TABLET, ORALLY DISINTEGRATING ORAL EVERY 12 HOURS PRN
Status: DISCONTINUED | OUTPATIENT
Start: 2023-10-15 | End: 2023-10-15 | Stop reason: HOSPADM

## 2023-10-15 RX ORDER — IBUPROFEN 600 MG/1
600 TABLET ORAL
Status: COMPLETED | OUTPATIENT
Start: 2023-10-15 | End: 2023-10-15

## 2023-10-15 RX ORDER — HYDROXYZINE HYDROCHLORIDE 25 MG/1
25 TABLET, FILM COATED ORAL 3 TIMES DAILY PRN
Status: DISCONTINUED | OUTPATIENT
Start: 2023-10-15 | End: 2023-10-15 | Stop reason: SDUPTHER

## 2023-10-15 RX ORDER — HYDROXYZINE PAMOATE 25 MG/1
25 CAPSULE ORAL 3 TIMES DAILY PRN
Status: DISCONTINUED | OUTPATIENT
Start: 2023-10-15 | End: 2023-10-15 | Stop reason: HOSPADM

## 2023-10-15 RX ADMIN — IBUPROFEN 600 MG: 600 TABLET, FILM COATED ORAL at 21:07

## 2023-10-15 RX ADMIN — OLANZAPINE 5 MG: 5 TABLET, ORALLY DISINTEGRATING ORAL at 21:07

## 2023-10-15 SDOH — ECONOMIC STABILITY - HOUSING INSECURITY: HOUSING INSTABILITY UNSPECIFIED: Z59.819

## 2023-10-15 ASSESSMENT — LIFESTYLE VARIABLES
HOW OFTEN DO YOU HAVE A DRINK CONTAINING ALCOHOL: 2-4 TIMES A MONTH
HOW MANY STANDARD DRINKS CONTAINING ALCOHOL DO YOU HAVE ON A TYPICAL DAY: 3 OR 4

## 2023-10-15 ASSESSMENT — PAIN SCALES - GENERAL: PAINLEVEL_OUTOF10: 0

## 2023-10-15 ASSESSMENT — PAIN - FUNCTIONAL ASSESSMENT: PAIN_FUNCTIONAL_ASSESSMENT: 0-10

## 2023-10-15 NOTE — ED NOTES
Suresh Franco  : 1995  MRN: [id number]  ConnectID: [id number]  REASON:  Thoughts of self harm - harm to others  ACUITY:  Emergent  SUBMITTED:  10/14/2023 21:22 EDT     Tammy Chou  10/14/23 2122

## 2023-10-15 NOTE — ED NOTES
Patient resting at this time. No signs or symptoms of distress. Respirations even and unlabored. Sitter at bedside. Safety precautions in place.        Liliana Nice RN  10/15/23 2012

## 2023-10-15 NOTE — ED NOTES
Patient resting at this time. No signs or symptoms of distress. Respirations even and unlabored. Sitter at bedside. Safety precautions in place.        Ulises Cuenca RN  10/15/23 9990

## 2023-10-15 NOTE — ED TRIAGE NOTES
Patient to ER from [facility] for suicidal ideations without a plan at this time, has seen tele psych notes in file, patient alert oriented and pleasant at this time.

## 2023-10-15 NOTE — ED NOTES
Patient resting at this time. No signs or symptoms of distress. Respirations even and unlabored. Sitter at bedside. Safety precautions in place.        Liya Garcia RN  10/15/23 0183

## 2023-10-15 NOTE — ED NOTES
Pt resting in bed, eyes closed, respirations even and unlabored. Sitter at bedside.      Donal Cota RN  10/14/23 4454

## 2023-10-15 NOTE — ED NOTES
Pt ambulated to the bathroom with sitter. Pt calm and cooperative.      Tirso Palmer RN  10/15/23 7751

## 2023-10-15 NOTE — ED NOTES
Patient resting at this time. No signs or symptoms of distress. Respirations even and unlabored. Sitter at bedside. Safety precautions in place.        Korin Luna RN  10/15/23 3658

## 2023-10-15 NOTE — ED TRIAGE NOTES
Pt to ED via EMS from police dept. Per EMS pt normally sleeps in the [facility] waiting room and today began having SI. States he had ideas of \"stealing a car, driving reckless, and crashing into a tree\". Reports no attempts today, but has in the past month by overdose. Pt states \"I'm not here to cause problems, I just want help\".     VSS  124/74  80 bpm  99% RA

## 2023-10-15 NOTE — ED NOTES
Patient resting at this time. No signs or symptoms of distress. Respirations even and unlabored. Sitter at bedside. Safety precautions in place.        Shreya Pearce RN  10/15/23 0732

## 2023-10-15 NOTE — ED NOTES
Report given to Alex joya RN. Pt resting in bed, eyes closed, respirations even and unlabored. Sitter at bedside.      Sher Jacinto RN  10/14/23 6334

## 2023-10-15 NOTE — ED NOTES
TRANSFER - OUT REPORT:    Verbal report given to Theodora  on Marta Rim  being transferred to [facility] ED for routine progression of patient care       Report consisted of patient's Situation, Background, Assessment and   Recommendations(SBAR). Information from the following report(s) Nurse Handoff Report, ED Encounter Summary, and Adult Overview was reviewed with the receiving nurse. [place] Assessment:    Presents to emergency department  because of falls (Syncope, seizure, or loss of consciousness): No  Age > 70: No  Altered Mental Status, Intoxication with alcohol or substance confusion (Disorientation, impaired judgment, poor safety awaremess, or inability to follow instructions): No  Impaired Mobility: Ambulates or transfers with assistive devices or assistance; Unable to ambulate or transer.: No             Lines:       Opportunity for questions and clarification was provided.       Patient transported with:  Natasha Moeller  10/15/23 0123

## 2023-10-16 VITALS
HEIGHT: 74 IN | OXYGEN SATURATION: 100 % | WEIGHT: 130 LBS | BODY MASS INDEX: 16.68 KG/M2 | SYSTOLIC BLOOD PRESSURE: 100 MMHG | TEMPERATURE: 98.1 F | DIASTOLIC BLOOD PRESSURE: 65 MMHG | HEART RATE: 55 BPM | RESPIRATION RATE: 16 BRPM

## 2023-10-16 NOTE — CARE COORDINATION
Chart review complete, CM attempted to meet with pt at bedside, pt awakens to talk with CM  but is short with answers, wants to be dc home today, states he is no longer having SI and never had a plan to hurt himself. Pt states he wants to dc to follow up with [facility], per pt he was seen at [facility] on Friday and wants to return to them for further help. Pt endorses he is homeless and stays wherever he can find a place. Pt states his only family lives in [place] and he cant return there d/t he is on parole. Pt was seen and evaluated by Tele psych and was never placed on IVC papers. Pt is noted uninsured and will not qualify for admission to inpt psych facility. Again pt is requesting to be dc home at this time. Shey Arciniega with [facility] in to see pt but he we not receptive to any offers of rehab at this time. 10/16/23 0913   Service Assessment   Patient Orientation Alert and Oriented   Cognition Alert   History Provided By Patient   Primary Caregiver Self   Accompanied By/Relationship none   Support Systems None   PCP Verified by CM Yes  (no current PCP)   Prior Functional Level Independent in ADLs/IADLs   Current Functional Level Independent in ADLs/IADLs   Can patient return to prior living arrangement Yes   Ability to make needs known: Good   Family able to assist with home care needs: No   Would you like for me to discuss the discharge plan with any other family members/significant others, and if so, who? No   Financial Resources None   Community Resources None   CM/SW Referral Psychiatry   Social/Functional History   Lives With Alone   Type of [facility] None   ADL Assistance Independent   Ambulation Assistance Independent   Transfer Assistance Independent   Occupation Part time employment   Type of Occupation [employer]   Discharge Planning   Type of [address] [place] [address] Prior To Admission None   Potential Assistance Needed N/A   DME Ordered?  No

## 2023-10-16 NOTE — ED NOTES
Report given to [facility], RN. Transferring patient care at this time.      Regina Person, RN  10/16/23 5907

## 2023-10-16 NOTE — ED NOTES
I have reviewed discharge instructions with the patient. The patient verbalized understanding. Patient left ED via Discharge Method: ambulatory to Home with self    Opportunity for questions and clarification provided. Patient given 0 scripts. To continue your aftercare when you leave the hospital, you may receive an automated call from our care team to check in on how you are doing. This is a free service and part of our promise to provide the best care and service to meet your aftercare needs. \" If you have questions, or wish to unsubscribe from this service please call [phone]. Thank you for Choosing our [address] [place] [address] Emergency Department.         Apryl Craft RN  10/16/23 1242

## 2023-10-16 NOTE — DISCHARGE INSTRUCTIONS
Follow-up with [facility]. Follow[facility]up with [facility] CHEM[facility]PENDENC[facility]. Return to ED if symptoms worsen or progress in any way.

## 2023-10-21 ENCOUNTER — HOSPITAL ENCOUNTER (EMERGENCY)
Age: 28
Discharge: HOME | End: 2023-10-21
Attending: EMERGENCY MEDICINE

## 2023-10-21 VITALS
BODY MASS INDEX: 16.69 KG/M2 | DIASTOLIC BLOOD PRESSURE: 92 MMHG | RESPIRATION RATE: 18 BRPM | SYSTOLIC BLOOD PRESSURE: 122 MMHG | OXYGEN SATURATION: 100 % | WEIGHT: 130 LBS | HEART RATE: 93 BPM | TEMPERATURE: 98.7 F

## 2023-10-21 DIAGNOSIS — F41.1 ANXIETY STATE: Primary | ICD-10-CM

## 2023-10-21 PROCEDURE — 6370000000 HC RX 637 (ALT 250 FOR IP): Performed by: EMERGENCY MEDICINE

## 2023-10-21 PROCEDURE — 99285 EMERGENCY DEPT VISIT HI MDM: CPT

## 2023-10-21 RX ORDER — CLONAZEPAM 1 MG/1
1 TABLET ORAL 2 TIMES DAILY PRN
Qty: 2 TABLET | Refills: 0 | Status: SHIPPED | OUTPATIENT
Start: 2023-10-21 | End: 2023-10-23

## 2023-10-21 RX ORDER — CLONAZEPAM 1 MG/1
1 TABLET ORAL
Status: COMPLETED | OUTPATIENT
Start: 2023-10-21 | End: 2023-10-21

## 2023-10-21 RX ADMIN — CLONAZEPAM 1 MG: 1 TABLET ORAL at 17:36

## 2023-10-21 NOTE — ED TRIAGE NOTES
Pt was filling out a job application today when he says a lot of stress hit him at once. Pt has history of anxiety attacks and he had the same symptoms as before with the SOB, palpitations. Pt denies chest pain.

## 2023-10-21 NOTE — ED PROVIDER NOTES
Emergency Department Provider Note       PCP: File, Not On   Age: 29 y.o. Sex: male     DISPOSITION Decision To Discharge 10/21/2023 05:15:17 PM       ICD-10-CM    1. Anxiety state  F41.1 clonazePAM (KLONOPIN) 1 MG tablet          Medical Decision Making   Anxiety versus malingering. Does not really meet any criteria for any further testing or evaluation. Complexity of Problems Addressed:  Complexity of Problem: 1 chronic illness with exacerbation. Data Reviewed and Analyzed:  I independently ordered and reviewed each unique test.  I reviewed external records: ED visit note from an outside group. Recent psychiatric evaluation that did not result in commitment. Done at another emergency department. Many emergency department notes regarding malingering, methamphetamine use and shelter seeking  The patients assessment required an independent historian: EMS. The reason they were needed is important historical information not provided by the patient. Discussion of management or test interpretation. Stable for discharge       Risk of Complications and/or Morbidity of Patient Management:  Prescription drug management performed and Diagnosis or care significantly limited by social determinants of health substance abuse and homelessness    History      55-year-old male brought in by EMS. States he was filling out a form for a job and became anxious. Shortness of breath and palpitations. EMS was called. They noted essentially normal vital signs. Patient states his anxiety has resolved. States he occasionally takes his Haldol. He has not been taking Klonopin lately. He has a counselor who prescribes it. Review of records reveals numerous visits in the last 2 months for anxiety, malingering, methamphetamine use. The history is provided by the patient and the EMS personnel. Physical Exam     Vitals signs and nursing note reviewed.    Vitals:    10/21/23 1656   BP: (!) 125/90   Pulse:

## 2023-10-22 ENCOUNTER — HOSPITAL ENCOUNTER (EMERGENCY)
Age: 28
Discharge: HOME | End: 2023-10-22
Attending: STUDENT IN AN ORGANIZED HEALTH CARE EDUCATION/TRAINING PROGRAM

## 2023-10-22 VITALS
HEIGHT: 74 IN | TEMPERATURE: 98.3 F | SYSTOLIC BLOOD PRESSURE: 119 MMHG | WEIGHT: 130 LBS | DIASTOLIC BLOOD PRESSURE: 80 MMHG | BODY MASS INDEX: 16.68 KG/M2 | OXYGEN SATURATION: 100 % | HEART RATE: 124 BPM | RESPIRATION RATE: 20 BRPM

## 2023-10-22 DIAGNOSIS — F41.9 ANXIOUSNESS: ICD-10-CM

## 2023-10-22 DIAGNOSIS — F19.10 POLYSUBSTANCE ABUSE (HCC): Primary | ICD-10-CM

## 2023-10-22 PROCEDURE — 99283 EMERGENCY DEPT VISIT LOW MDM: CPT

## 2023-10-22 PROCEDURE — 6370000000 HC RX 637 (ALT 250 FOR IP): Performed by: STUDENT IN AN ORGANIZED HEALTH CARE EDUCATION/TRAINING PROGRAM

## 2023-10-22 RX ORDER — HYDROXYZINE PAMOATE 25 MG/1
25 CAPSULE ORAL
Status: COMPLETED | OUTPATIENT
Start: 2023-10-22 | End: 2023-10-22

## 2023-10-22 RX ADMIN — HYDROXYZINE PAMOATE 25 MG: 25 CAPSULE ORAL at 04:33

## 2023-10-22 ASSESSMENT — LIFESTYLE VARIABLES
HOW OFTEN DO YOU HAVE A DRINK CONTAINING ALCOHOL: NEVER
HOW MANY STANDARD DRINKS CONTAINING ALCOHOL DO YOU HAVE ON A TYPICAL DAY: 3 OR 4

## 2023-10-22 ASSESSMENT — PAIN - FUNCTIONAL ASSESSMENT: PAIN_FUNCTIONAL_ASSESSMENT: NONE - DENIES PAIN

## 2023-10-22 NOTE — ED PROVIDER NOTES
Emergency Department Provider Note       PCP: File, Not On   Age: 29 y.o. Sex: male     DISPOSITION Decision To Discharge 10/22/2023 04:26:44 AM       ICD-10-CM    1. Polysubstance abuse ([address])  F19.10       2. Anxiousness  F41.9           Medical Decision Making     Complexity of Problems Addressed:  Complexity of Problem: 1 chronic illness with exacerbation. Data Reviewed and Analyzed:  I independently ordered and reviewed each unique test.  I reviewed external records: ED visit note from an outside group. Discussion of management or test interpretation. 70-year-old male presents to the emergency department via EMS requesting help for his drug abuse. Patient reports taking what he thought was ecstasy earlier tonight was unsure of what it actually was. Denies homicidal suicidal ideation. States he does feel very antsy. Patient with a well-established polysubstance abuse history with multiple prior ER visits, 11 visits this month between our emergency department and [facility]. Multiple prior documented visits secondary to patient's homelessness and malingering. Will give Vistaril for his anxiousness. Patient given multiple resources for outpatient detox and homeless shelters. Patient is tachycardic likely secondary to his Poly substance abuse. He is tolerating p.o. fluids here in the ER. He is in no distress, not a harm to himself or others and is safe for discharged from the ER. Risk of Complications and/or Morbidity of Patient Management:  Shared medical decision making was utilized in creating the patients health plan today. History      70-year-old male presents to the emergency department via EMS requesting help for his drug abuse. Patient reports taking what he thought was ecstasy earlier tonight was unsure of what it actually was. Denies homicidal suicidal ideation. States he does feel very antsy.   Patient with a well-established polysubstance abuse history with multiple

## 2023-10-22 NOTE — ED TRIAGE NOTES
Here after taking street drugs unsure what it was states he wants help    States he is homeless, and said he was seen earlier   Request resources

## 2023-10-22 NOTE — DISCHARGE INSTRUCTIONS
Follow-up with the resources provided to you. Call the [facility] on Monday. Return to the ER for worsening or worrisome symptoms.

## 2023-10-24 ENCOUNTER — HOSPITAL ENCOUNTER (EMERGENCY)
Age: 28
Discharge: HOME | End: 2023-10-24
Attending: EMERGENCY MEDICINE

## 2023-10-24 VITALS
HEIGHT: 74 IN | BODY MASS INDEX: 16.68 KG/M2 | TEMPERATURE: 98.2 F | OXYGEN SATURATION: 98 % | WEIGHT: 130 LBS | RESPIRATION RATE: 20 BRPM | SYSTOLIC BLOOD PRESSURE: 128 MMHG | DIASTOLIC BLOOD PRESSURE: 84 MMHG | HEART RATE: 98 BPM

## 2023-10-24 DIAGNOSIS — F41.9 ANXIETY: Primary | ICD-10-CM

## 2023-10-24 PROCEDURE — 6370000000 HC RX 637 (ALT 250 FOR IP): Performed by: EMERGENCY MEDICINE

## 2023-10-24 PROCEDURE — 99283 EMERGENCY DEPT VISIT LOW MDM: CPT

## 2023-10-24 RX ORDER — CLONAZEPAM 1 MG/1
1 TABLET ORAL
Status: COMPLETED | OUTPATIENT
Start: 2023-10-24 | End: 2023-10-24

## 2023-10-24 RX ADMIN — CLONAZEPAM 1 MG: 1 TABLET ORAL at 23:50

## 2023-10-25 NOTE — ED PROVIDER NOTES
Emergency Department Provider Note       PCP: File, Not On   Age: 29 y.o. Sex: male     DISPOSITION Decision To Discharge 10/24/2023 11:38:06 PM       ICD-10-CM    1. Anxiety  F41.9           Medical Decision Making     Complexity of Problems Addressed:  1 or more chronic illnesses with a severe exacerbation or progression. Data Reviewed and Analyzed:  I independently ordered and reviewed each unique test.  I reviewed external records: ED visit note from an outside group. I reviewed external records: provider visit note from outside specialist.           Discussion of management or test interpretation. DDX:    Suicidal ideations, homicidal ideations, self-inflicted injury,    Schizophrenia, schizoaffective disorder, personality disorder, major depression,  depression with anxiety, depression reactive to situation, depression, anxiety, panic  attack, hyperventilation syndrome, bipolar disorder,    Substance abuse, medication noncompliance, drug abuse, alcohol abuse, alcohol  intoxication,          Risk of Complications and/or Morbidity of Patient Management:  Shared medical decision making was utilized in creating the patients health plan today. History       Patient is a 27-year-old male presenting to the emergency department today requesting refill of his Klonopin. Patient states he had some chest pain earlier tonight but he thinks it is now was just a panic attack. Patient has a history of anxiety and follows with [facility]. He was seen and evaluated yesterday at [facility]. Patient states he is out of his medicine and does not have a follow-up appointment till December. He says he is not back to his baseline now. \"If I can just sit in your lobby until the buses start running at 5 AM as long as they do not cause a disturbance that would be really cool. \"           Review of Systems   Psychiatric/Behavioral:  The patient is nervous/anxious.         Physical Exam     Vitals signs

## 2023-10-25 NOTE — ED TRIAGE NOTES
Pt presents to ED with c/o anxiety. Pt has chronic anxiety and has been a frequent flyer at multiple hospitals. Pt states he needs more medication for his anxiety.

## 2023-10-26 ENCOUNTER — HOSPITAL ENCOUNTER (EMERGENCY)
Age: 28
Discharge: HOME | End: 2023-10-26
Attending: EMERGENCY MEDICINE

## 2023-10-26 VITALS
DIASTOLIC BLOOD PRESSURE: 83 MMHG | SYSTOLIC BLOOD PRESSURE: 126 MMHG | BODY MASS INDEX: 16.68 KG/M2 | HEART RATE: 91 BPM | HEIGHT: 74 IN | RESPIRATION RATE: 16 BRPM | OXYGEN SATURATION: 100 % | TEMPERATURE: 98.2 F | WEIGHT: 130 LBS

## 2023-10-26 DIAGNOSIS — F19.10 POLYSUBSTANCE ABUSE (HCC): ICD-10-CM

## 2023-10-26 DIAGNOSIS — F41.9 ANXIETY: Primary | ICD-10-CM

## 2023-10-26 PROCEDURE — 99283 EMERGENCY DEPT VISIT LOW MDM: CPT

## 2023-10-26 ASSESSMENT — LIFESTYLE VARIABLES
HOW OFTEN DO YOU HAVE A DRINK CONTAINING ALCOHOL: NEVER
HOW MANY STANDARD DRINKS CONTAINING ALCOHOL DO YOU HAVE ON A TYPICAL DAY: PATIENT DOES NOT DRINK

## 2023-10-26 ASSESSMENT — ENCOUNTER SYMPTOMS
SHORTNESS OF BREATH: 0
ABDOMINAL PAIN: 1
NAUSEA: 0
DIARRHEA: 0
VOMITING: 0

## 2023-10-26 ASSESSMENT — PAIN - FUNCTIONAL ASSESSMENT: PAIN_FUNCTIONAL_ASSESSMENT: NONE - DENIES PAIN

## 2023-10-26 NOTE — ED PROVIDER NOTES
[employer] Emergency Department Provider Note                   PCP:                No, Pcp               Age: 29 y.o. Sex: male     MEDICAL DECISION MAKING  Complexity of Problems Addressed:   1 or more chronic illness with an exacerbation or progression    Data Reviewed and Analyzed:  Category 1:    I have reviewed outside records from an external source for any pertinent PMH, ED visits, primary care visits, specialist visits, labs, EKG, and/or radiologic studies. Category 2:                         Category 3:     Discussion of management or test interpretation:    MDM  Number of Diagnoses or Management Options  Anxiety  Polysubstance abuse ([address])  Diagnosis management comments: Patient with polysubstance abuse, anxiety, malingering, and antisocial disorder presented for anxiety attack. I reviewed patient's records and he has had approximately 18 ED visits so far this month. Patient was seen yesterday at [facility] emergency department and evaluated by a psychiatrist who did not recommend inpatient psychiatric treatment. Patient is not hallucinating now and has no suicidal or homicidal ideations. Patient complained of abdominal pain from his anxiety but he has no tenderness on exam and I do not suspect any acute intra-abdominal pelvic process that needs any work-up. I spoke with our  worker who contacted the [facility]. Patient was given substance abuse outpatient resources to follow-up with. He is calm in the emergency department I do not feel that he needs benzodiazepines at this point given his substance abuse history. I do not feel the patient needs inpatient psychiatric treatment nor ED psychiatric evaluation. Patient was discharged home.           Mesfin Chavis is a 29 y.o. male who presents to the Emergency Department with chief complaint of    Chief Complaint   Patient presents with    Anxiety      Patient has antisocial disorder, anxiety, substance abuse

## 2023-10-26 NOTE — ED TRIAGE NOTES
Pt arrives to ed via ems from the community. Chief complaint anxiety and thinks someone is trying to kill him. Stomach pain.

## 2023-11-05 ENCOUNTER — HOSPITAL ENCOUNTER (EMERGENCY)
Age: 28
Discharge: HOME | End: 2023-11-05
Attending: GENERAL PRACTICE

## 2023-11-05 ENCOUNTER — APPOINTMENT (OUTPATIENT)
Dept: GENERAL RADIOLOGY | Age: 28
End: 2023-11-05

## 2023-11-05 VITALS
OXYGEN SATURATION: 100 % | HEIGHT: 74 IN | RESPIRATION RATE: 16 BRPM | WEIGHT: 140 LBS | DIASTOLIC BLOOD PRESSURE: 87 MMHG | TEMPERATURE: 98.1 F | SYSTOLIC BLOOD PRESSURE: 124 MMHG | HEART RATE: 82 BPM | BODY MASS INDEX: 17.97 KG/M2

## 2023-11-05 DIAGNOSIS — R07.89 ATYPICAL CHEST PAIN: ICD-10-CM

## 2023-11-05 DIAGNOSIS — R11.2 NAUSEA AND VOMITING, UNSPECIFIED VOMITING TYPE: Primary | ICD-10-CM

## 2023-11-05 LAB
ALBUMIN SERPL-MCNC: 4.2 G/DL (ref 3.5–5)
ALBUMIN/GLOB SERPL: 1.1 (ref 0.4–1.6)
ALP SERPL-CCNC: 56 U/L (ref 50–136)
ALT SERPL-CCNC: 39 U/L (ref 12–65)
ANION GAP SERPL CALC-SCNC: 4 MMOL/L (ref 2–11)
AST SERPL-CCNC: 30 U/L (ref 15–37)
BASOPHILS # BLD: 0.1 K/UL (ref 0–0.2)
BASOPHILS NFR BLD: 1 % (ref 0–2)
BILIRUB SERPL-MCNC: 0.2 MG/DL (ref 0.2–1.1)
BUN SERPL-MCNC: 14 MG/DL (ref 6–23)
CALCIUM SERPL-MCNC: 8.7 MG/DL (ref 8.3–10.4)
CHLORIDE SERPL-SCNC: 106 MMOL/L (ref 101–110)
CO2 SERPL-SCNC: 27 MMOL/L (ref 21–32)
CREAT SERPL-MCNC: 0.9 MG/DL (ref 0.8–1.5)
DIFFERENTIAL METHOD BLD: ABNORMAL
EOSINOPHIL # BLD: 0.1 K/UL (ref 0–0.8)
EOSINOPHIL NFR BLD: 1 % (ref 0.5–7.8)
ERYTHROCYTE [DISTWIDTH] IN BLOOD BY AUTOMATED COUNT: 14.9 % (ref 11.9–14.6)
GLOBULIN SER CALC-MCNC: 3.8 G/DL (ref 2.8–4.5)
GLUCOSE SERPL-MCNC: 86 MG/DL (ref 65–100)
HCT VFR BLD AUTO: 42.2 % (ref 41.1–50.3)
HGB BLD-MCNC: 13.8 G/DL (ref 13.6–17.2)
IMM GRANULOCYTES # BLD AUTO: 0.1 K/UL (ref 0–0.5)
IMM GRANULOCYTES NFR BLD AUTO: 1 % (ref 0–5)
LYMPHOCYTES # BLD: 1.7 K/UL (ref 0.5–4.6)
LYMPHOCYTES NFR BLD: 23 % (ref 13–44)
MCH RBC QN AUTO: 28.3 PG (ref 26.1–32.9)
MCHC RBC AUTO-ENTMCNC: 32.7 G/DL (ref 31.4–35)
MCV RBC AUTO: 86.7 FL (ref 82–102)
MONOCYTES # BLD: 0.8 K/UL (ref 0.1–1.3)
MONOCYTES NFR BLD: 11 % (ref 4–12)
NEUTS SEG # BLD: 5 K/UL (ref 1.7–8.2)
NEUTS SEG NFR BLD: 63 % (ref 43–78)
NRBC # BLD: 0 K/UL (ref 0–0.2)
PLATELET # BLD AUTO: 342 K/UL (ref 150–450)
PMV BLD AUTO: 9.4 FL (ref 9.4–12.3)
POTASSIUM SERPL-SCNC: 4.3 MMOL/L (ref 3.5–5.1)
PROT SERPL-MCNC: 8 G/DL (ref 6.3–8.2)
RBC # BLD AUTO: 4.87 M/UL (ref 4.23–5.6)
SODIUM SERPL-SCNC: 137 MMOL/L (ref 133–143)
TROPONIN I SERPL HS-MCNC: 7 PG/ML (ref 0–14)
WBC # BLD AUTO: 7.7 K/UL (ref 4.3–11.1)

## 2023-11-05 PROCEDURE — 93005 ELECTROCARDIOGRAM TRACING: CPT | Performed by: GENERAL PRACTICE

## 2023-11-05 PROCEDURE — 71045 X-RAY EXAM CHEST 1 VIEW: CPT

## 2023-11-05 PROCEDURE — 99285 EMERGENCY DEPT VISIT HI MDM: CPT

## 2023-11-05 PROCEDURE — 80053 COMPREHEN METABOLIC PANEL: CPT

## 2023-11-05 PROCEDURE — 84484 ASSAY OF TROPONIN QUANT: CPT

## 2023-11-05 PROCEDURE — 85025 COMPLETE CBC W/AUTO DIFF WBC: CPT

## 2023-11-05 ASSESSMENT — PAIN - FUNCTIONAL ASSESSMENT: PAIN_FUNCTIONAL_ASSESSMENT: 0-10

## 2023-11-05 ASSESSMENT — PAIN SCALES - GENERAL: PAINLEVEL_OUTOF10: 0

## 2023-11-05 NOTE — ED TRIAGE NOTES
Pt endorses continued nausea without emesis at this time. Reports dizziness unrelated to activity started today and some chest pain earlier today.  (-)abdomen pain (+)diarrhea    A&OX4

## 2023-11-05 NOTE — ED TRIAGE NOTES
Pt brought in by medic 29 from outside [facility] reports nausea and emesis. Given ODT zofran PTA. Discharged from rehab yesterday for meth use, endorses ETOH last night.    ECG NSR prior to arrival     A&Ox4

## 2023-11-05 NOTE — ED PROVIDER NOTES
Emergency Department Provider Note       PCP: No, Pcp   Age: 29 y.o. Sex: male     DISPOSITION Decision To Discharge 11/05/2023 04:36:30 PM       ICD-10-CM    1. Nausea and vomiting, unspecified vomiting type  R11.2       2. Atypical chest pain  R07.89           Medical Decision Making     Complexity of Problems Addressed:  1 or more acute illnesses that pose a threat to life or bodily function. Data Reviewed and Analyzed:  I independently ordered and reviewed each unique test.         I interpreted the X-rays chest x-ray shows no evidence of infiltrate or edema and heart size is normal.  I have reviewed and agree with radiology report. I interpreted the EKG shows no acute ST changes. For complete interpretation please review the procedure section. .    Discussion of management or test interpretation. Patient presents with atypical chest pain and vomiting. I have considered cardiopulmonary emergencies and there is nothing to suggest emergent pathology such as ACS, PE, TAD, myocarditis, CHF, or any other emergent pathology. Patient feels improved here. He will be treated symptomatically with close follow-up. Risk of Complications and/or Morbidity of Patient Management:  Shared medical decision making was utilized in creating the patients health plan today. Diagnosis or care significantly limited by social determinants of health substance abuse. History       Patient presents with mild substernal chest pain. He also has some associated nausea. He has had multiple episodes of vomiting nonbloody nonbilious. He has some mild diarrhea as well. He denies abdominal pain. Chest pain does not radiate to the back. There is no exertional chest pain. He denies any fevers or cough. Patient denies any marijuana use or other substances at this time. Review of Systems   All other systems reviewed and are negative.       Physical Exam     Vitals signs and nursing note reviewed:  Vitals:

## 2023-11-06 LAB
EKG ATRIAL RATE: 89 BPM
EKG DIAGNOSIS: NORMAL
EKG P AXIS: 71 DEGREES
EKG P-R INTERVAL: 158 MS
EKG Q-T INTERVAL: 374 MS
EKG QRS DURATION: 90 MS
EKG QTC CALCULATION (BAZETT): 455 MS
EKG R AXIS: 70 DEGREES
EKG T AXIS: 52 DEGREES
EKG VENTRICULAR RATE: 89 BPM

## 2023-11-06 PROCEDURE — 93010 ELECTROCARDIOGRAM REPORT: CPT | Performed by: INTERNAL MEDICINE

## 2023-12-09 ENCOUNTER — HOSPITAL ENCOUNTER (EMERGENCY)
Age: 28
Discharge: HOME | End: 2023-12-09

## 2023-12-09 VITALS
OXYGEN SATURATION: 95 % | HEIGHT: 75 IN | HEART RATE: 95 BPM | DIASTOLIC BLOOD PRESSURE: 91 MMHG | TEMPERATURE: 98.1 F | WEIGHT: 135 LBS | RESPIRATION RATE: 18 BRPM | BODY MASS INDEX: 16.78 KG/M2 | SYSTOLIC BLOOD PRESSURE: 134 MMHG

## 2023-12-09 DIAGNOSIS — R51.9 ACUTE NONINTRACTABLE HEADACHE, UNSPECIFIED HEADACHE TYPE: Primary | ICD-10-CM

## 2023-12-09 PROBLEM — Z76.5 MALINGERING: Status: ACTIVE | Noted: 2022-03-25

## 2023-12-09 PROBLEM — F15.920: Status: ACTIVE | Noted: 2022-03-25

## 2023-12-09 PROBLEM — Z59.00 HOMELESSNESS: Status: ACTIVE | Noted: 2022-03-18

## 2023-12-09 PROBLEM — F15.90 STIMULANT USE DISORDER: Status: ACTIVE | Noted: 2021-09-22

## 2023-12-09 PROBLEM — R45.851 SUICIDAL THOUGHTS: Status: ACTIVE | Noted: 2023-06-27

## 2023-12-09 PROBLEM — F43.20 ADJUSTMENT REACTION: Status: ACTIVE | Noted: 2022-05-10

## 2023-12-09 PROBLEM — S02.109A BASILAR SKULL FRACTURE (HCC): Status: ACTIVE | Noted: 2020-08-24

## 2023-12-09 PROBLEM — H91.92 HEARING LOSS OF LEFT EAR: Status: ACTIVE | Noted: 2020-09-29

## 2023-12-09 PROBLEM — H53.8 BLURRY VISION, BILATERAL: Status: ACTIVE | Noted: 2020-09-29

## 2023-12-09 PROBLEM — F12.20 CANNABIS USE DISORDER, SEVERE, DEPENDENCE (HCC): Status: ACTIVE | Noted: 2021-09-22
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
## 2023-12-09 PROBLEM — S06.9XAA TRAUMATIC BRAIN INJURY (HCC): Status: ACTIVE | Noted: 2020-09-29

## 2023-12-09 PROBLEM — F19.959 SUBSTANCE-INDUCED PSYCHOTIC DISORDER (HCC): Status: ACTIVE | Noted: 2023-06-29

## 2023-12-09 PROBLEM — F19.10 POLYSUBSTANCE ABUSE (HCC): Status: ACTIVE | Noted: 2022-05-10

## 2023-12-09 PROBLEM — S09.22XA TRAUMATIC RUPTURE OF LEFT EAR DRUM: Status: ACTIVE | Noted: 2020-09-29

## 2023-12-09 PROBLEM — F60.2 ANTISOCIAL PERSONALITY DISORDER (HCC): Status: ACTIVE | Noted: 2022-03-25

## 2023-12-09 PROBLEM — F32.A DEPRESSION: Status: ACTIVE | Noted: 2022-03-18

## 2023-12-09 PROCEDURE — 6370000000 HC RX 637 (ALT 250 FOR IP)

## 2023-12-09 PROCEDURE — 99283 EMERGENCY DEPT VISIT LOW MDM: CPT

## 2023-12-09 RX ORDER — KETOROLAC TROMETHAMINE 30 MG/ML
30 INJECTION, SOLUTION INTRAMUSCULAR; INTRAVENOUS
Status: DISCONTINUED | OUTPATIENT
Start: 2023-12-09 | End: 2023-12-09

## 2023-12-09 RX ORDER — IBUPROFEN 600 MG/1
600 TABLET ORAL
Status: COMPLETED | OUTPATIENT
Start: 2023-12-09 | End: 2023-12-09

## 2023-12-09 RX ADMIN — IBUPROFEN 600 MG: 600 TABLET, FILM COATED ORAL at 21:53

## 2023-12-09 ASSESSMENT — PAIN - FUNCTIONAL ASSESSMENT: PAIN_FUNCTIONAL_ASSESSMENT: 0-10

## 2023-12-09 ASSESSMENT — PAIN SCALES - GENERAL: PAINLEVEL_OUTOF10: 8
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
## 2023-12-10 NOTE — DISCHARGE INSTRUCTIONS
Evaluated today for headache. Physical exam is very reassuring.     No indication for blood work or imaging today    You initially wanted a shot of Toradol however you have changed her mind and we have given you some ibuprofen    Contacted [facility] to establish primary care if you do not have a primary care provider    Return to the emergency department for chest pain, shortness of breath, signs and symptoms of stroke as assumed discharge

## 2023-12-10 NOTE — ED PROVIDER NOTES
Emergency Department Provider Note       PCP: No, Pcp   Age: 29 y.o. Sex: male     DISPOSITION Decision To Discharge 12/09/2023 09:33:10 PM       ICD-10-CM    1. Acute nonintractable headache, unspecified headache type  R51.9           Medical Decision Making     Complexity of Problems Addressed:  Complexity of Problem: 1 acute, uncomplicated illness or injury. Data Reviewed and Analyzed:  I independently ordered and reviewed each unique test.  I reviewed external records: ED visit note from an outside group. Reviewed note from 12//23 at outside ER. Extensive outside ER visits for substance abuse, methamphetamine, malingering          Discussion of management or test interpretation. Vital signs reviewed, patient stable, NAD, afebrile, nontoxic in appearance     Overall well-appearing 66-year-old male who presents for headache for couple of hours without other complaints. Has not taken any medications. Is currently homeless. Was unable to state the hotel where his mother works as she has not booked him a room in time. Patient has no other complaints or concerns. Patient agreeable to IM Toradol for headache    No concerning findings on physical exam.  No indication for CT imaging at this time. Based on history, physical exam, I do not feel any imaging or blood work is warranted at this time. No urgent emergent findings. Patient stable and can be discharged home with follow-up to primary care. Will give contact information for [facility]. Discharged home in stable condition  ED Course as of 12/09/23 2136   Sat Dec 09, 2023   2132 Patient has changes mind and he does not want Toradol. I will give him some Motrin [JG]      ED Course User Index  [JG] TOÑITO Beaver       Risk of Complications and/or Morbidity of Patient Management:  OTC drug management performed and Shared medical decision making was utilized in creating the patients health plan today.     History      29year-old

## 2023-12-14 ENCOUNTER — HOSPITAL ENCOUNTER (EMERGENCY)
Age: 28
Discharge: HOME | End: 2023-12-14
Attending: EMERGENCY MEDICINE

## 2023-12-14 VITALS
TEMPERATURE: 98.6 F | WEIGHT: 250 LBS | HEIGHT: 74 IN | BODY MASS INDEX: 32.08 KG/M2 | HEART RATE: 106 BPM | RESPIRATION RATE: 18 BRPM | DIASTOLIC BLOOD PRESSURE: 86 MMHG | SYSTOLIC BLOOD PRESSURE: 128 MMHG | OXYGEN SATURATION: 100 %

## 2023-12-14 DIAGNOSIS — R51.9 NONINTRACTABLE EPISODIC HEADACHE, UNSPECIFIED HEADACHE TYPE: Primary | ICD-10-CM

## 2023-12-14 PROCEDURE — 6370000000 HC RX 637 (ALT 250 FOR IP): Performed by: EMERGENCY MEDICINE

## 2023-12-14 PROCEDURE — 99283 EMERGENCY DEPT VISIT LOW MDM: CPT

## 2023-12-14 RX ORDER — IBUPROFEN 600 MG/1
600 TABLET ORAL EVERY 8 HOURS PRN
Qty: 30 TABLET | Refills: 0 | Status: SHIPPED | OUTPATIENT
Start: 2023-12-14 | End: 2023-12-24

## 2023-12-14 RX ORDER — IBUPROFEN 600 MG/1
600 TABLET ORAL EVERY 6 HOURS PRN
Status: DISCONTINUED | OUTPATIENT
Start: 2023-12-14 | End: 2023-12-15 | Stop reason: HOSPADM

## 2023-12-14 RX ADMIN — IBUPROFEN 600 MG: 600 TABLET, FILM COATED ORAL at 22:43

## 2023-12-14 ASSESSMENT — PAIN - FUNCTIONAL ASSESSMENT: PAIN_FUNCTIONAL_ASSESSMENT: NONE - DENIES PAIN

## 2023-12-15 NOTE — ED TRIAGE NOTES
Pt arrives to the ED with complaints of a migraine that started a couple hours ago. Pt admits to hx of high blood pressure when he comes to the doctor. Pt with hx of anxiety and states he should be taking medications, but hasn't been prescribed. Breathing even and unlabored, no active signs of distress.

## 2024-01-01 ENCOUNTER — HOSPITAL ENCOUNTER (EMERGENCY)
Age: 29
Discharge: HOME | End: 2024-01-01

## 2024-01-05 ENCOUNTER — HOSPITAL ENCOUNTER (EMERGENCY)
Age: 29
Discharge: HOME | End: 2024-01-06
Attending: STUDENT IN AN ORGANIZED HEALTH CARE EDUCATION/TRAINING PROGRAM

## 2024-01-05 DIAGNOSIS — R45.850 HOMICIDAL IDEATION: Primary | ICD-10-CM

## 2024-01-05 LAB
AMPHET UR QL SCN: POSITIVE
APPEARANCE UR: CLEAR
BACTERIA URNS QL MICRO: ABNORMAL /HPF
BARBITURATES UR QL SCN: NEGATIVE
BENZODIAZ UR QL: NEGATIVE
BILIRUB UR QL: NEGATIVE
CANNABINOIDS UR QL SCN: POSITIVE
COCAINE UR QL SCN: NEGATIVE
COLOR UR: ABNORMAL
EPI CELLS #/AREA URNS HPF: ABNORMAL /HPF
GLUCOSE UR STRIP.AUTO-MCNC: NEGATIVE MG/DL
HGB UR QL STRIP: ABNORMAL
KETONES UR QL STRIP.AUTO: ABNORMAL MG/DL
LEUKOCYTE ESTERASE UR QL STRIP.AUTO: NEGATIVE
METHADONE UR QL: NEGATIVE
NITRITE UR QL STRIP.AUTO: NEGATIVE
OPIATES UR QL: NEGATIVE
PCP UR QL: NEGATIVE
PH UR STRIP: 7 (ref 5–9)
PROT UR STRIP-MCNC: NEGATIVE MG/DL
RBC #/AREA URNS HPF: ABNORMAL /HPF
SP GR UR REFRACTOMETRY: 1.02 (ref 1–1.02)
UROBILINOGEN UR QL STRIP.AUTO: 1 EU/DL (ref 0.2–1)
WBC URNS QL MICRO: ABNORMAL /HPF

## 2024-01-05 PROCEDURE — 80053 COMPREHEN METABOLIC PANEL: CPT

## 2024-01-05 PROCEDURE — 84443 ASSAY THYROID STIM HORMONE: CPT

## 2024-01-05 PROCEDURE — 99285 EMERGENCY DEPT VISIT HI MDM: CPT

## 2024-01-05 PROCEDURE — 82077 ASSAY SPEC XCP UR&BREATH IA: CPT

## 2024-01-05 PROCEDURE — 80307 DRUG TEST PRSMV CHEM ANLYZR: CPT

## 2024-01-05 PROCEDURE — 81001 URINALYSIS AUTO W/SCOPE: CPT

## 2024-01-05 PROCEDURE — 85025 COMPLETE CBC W/AUTO DIFF WBC: CPT

## 2024-01-06 VITALS
TEMPERATURE: 97.8 F | BODY MASS INDEX: 20.53 KG/M2 | WEIGHT: 160 LBS | DIASTOLIC BLOOD PRESSURE: 72 MMHG | HEIGHT: 74 IN | RESPIRATION RATE: 18 BRPM | SYSTOLIC BLOOD PRESSURE: 135 MMHG | HEART RATE: 88 BPM | OXYGEN SATURATION: 98 %

## 2024-01-06 LAB
ALBUMIN SERPL-MCNC: 3.9 G/DL (ref 3.5–5)
ALBUMIN/GLOB SERPL: 1 (ref 0.4–1.6)
ALP SERPL-CCNC: 71 U/L (ref 50–136)
ALT SERPL-CCNC: 27 U/L (ref 12–65)
ANION GAP SERPL CALC-SCNC: 6 MMOL/L (ref 2–11)
AST SERPL-CCNC: 26 U/L (ref 15–37)
BASOPHILS # BLD: 0 K/UL (ref 0–0.2)
BASOPHILS NFR BLD: 1 % (ref 0–2)
BILIRUB SERPL-MCNC: 0.2 MG/DL (ref 0.2–1.1)
BUN SERPL-MCNC: 12 MG/DL (ref 6–23)
CALCIUM SERPL-MCNC: 9.3 MG/DL (ref 8.3–10.4)
CHLORIDE SERPL-SCNC: 106 MMOL/L (ref 103–113)
CO2 SERPL-SCNC: 28 MMOL/L (ref 21–32)
CREAT SERPL-MCNC: 0.92 MG/DL (ref 0.8–1.5)
DIFFERENTIAL METHOD BLD: ABNORMAL
EOSINOPHIL # BLD: 0.2 K/UL (ref 0–0.8)
EOSINOPHIL NFR BLD: 3 % (ref 0.5–7.8)
ERYTHROCYTE [DISTWIDTH] IN BLOOD BY AUTOMATED COUNT: 14.2 % (ref 11.9–14.6)
ETHANOL SERPL-MCNC: 8 MG/DL (ref 0–0.08)
GLOBULIN SER CALC-MCNC: 3.8 G/DL (ref 2.8–4.5)
GLUCOSE SERPL-MCNC: 80 MG/DL (ref 65–100)
HCT VFR BLD AUTO: 42 % (ref 41.1–50.3)
HGB BLD-MCNC: 13.5 G/DL (ref 13.6–17.2)
IMM GRANULOCYTES # BLD AUTO: 0 K/UL (ref 0–0.5)
IMM GRANULOCYTES NFR BLD AUTO: 0 % (ref 0–5)
LYMPHOCYTES # BLD: 2.2 K/UL (ref 0.5–4.6)
LYMPHOCYTES NFR BLD: 35 % (ref 13–44)
MCH RBC QN AUTO: 27.7 PG (ref 26.1–32.9)
MCHC RBC AUTO-ENTMCNC: 32.1 G/DL (ref 31.4–35)
MCV RBC AUTO: 86.2 FL (ref 82–102)
MONOCYTES # BLD: 0.5 K/UL (ref 0.1–1.3)
MONOCYTES NFR BLD: 8 % (ref 4–12)
NEUTS SEG # BLD: 3.4 K/UL (ref 1.7–8.2)
NEUTS SEG NFR BLD: 54 % (ref 43–78)
NRBC # BLD: 0 K/UL (ref 0–0.2)
PLATELET # BLD AUTO: 328 K/UL (ref 150–450)
PMV BLD AUTO: 10 FL (ref 9.4–12.3)
POTASSIUM SERPL-SCNC: 3.8 MMOL/L (ref 3.5–5.1)
PROT SERPL-MCNC: 7.7 G/DL (ref 6.3–8.2)
RBC # BLD AUTO: 4.87 M/UL (ref 4.23–5.6)
SODIUM SERPL-SCNC: 140 MMOL/L (ref 136–146)
TSH W FREE THYROID IF ABNORMAL: 0.86 UIU/ML (ref 0.36–3.74)
WBC # BLD AUTO: 6.3 K/UL (ref 4.3–11.1)

## 2024-01-06 PROCEDURE — 6360000002 HC RX W HCPCS: Performed by: STUDENT IN AN ORGANIZED HEALTH CARE EDUCATION/TRAINING PROGRAM

## 2024-01-06 PROCEDURE — 96372 THER/PROPH/DIAG INJ SC/IM: CPT

## 2024-01-06 RX ORDER — ZIPRASIDONE MESYLATE 20 MG/ML
20 INJECTION, POWDER, LYOPHILIZED, FOR SOLUTION INTRAMUSCULAR ONCE
Status: COMPLETED | OUTPATIENT
Start: 2024-01-06 | End: 2024-01-06

## 2024-01-06 RX ORDER — OLANZAPINE 5 MG/1
5 TABLET, ORALLY DISINTEGRATING ORAL EVERY 12 HOURS
Status: DISCONTINUED | OUTPATIENT
Start: 2024-01-06 | End: 2024-01-06 | Stop reason: HOSPADM

## 2024-01-06 RX ORDER — HYDROXYZINE HYDROCHLORIDE 25 MG/1
25 TABLET, FILM COATED ORAL EVERY 8 HOURS PRN
Status: DISCONTINUED | OUTPATIENT
Start: 2024-01-06 | End: 2024-01-06 | Stop reason: HOSPADM

## 2024-01-06 RX ORDER — OLANZAPINE 5 MG/1
5 TABLET, ORALLY DISINTEGRATING ORAL 2 TIMES DAILY
Status: DISCONTINUED | OUTPATIENT
Start: 2024-01-06 | End: 2024-01-06

## 2024-01-06 RX ORDER — LORAZEPAM 2 MG/ML
2 INJECTION INTRAMUSCULAR EVERY 6 HOURS PRN
Status: DISCONTINUED | OUTPATIENT
Start: 2024-01-06 | End: 2024-01-06 | Stop reason: HOSPADM

## 2024-01-06 RX ORDER — LORAZEPAM 1 MG/1
1 TABLET ORAL EVERY 4 HOURS PRN
Status: DISCONTINUED | OUTPATIENT
Start: 2024-01-06 | End: 2024-01-06 | Stop reason: HOSPADM

## 2024-01-06 RX ADMIN — ZIPRASIDONE MESYLATE 20 MG: 20 INJECTION, POWDER, LYOPHILIZED, FOR SOLUTION INTRAMUSCULAR at 06:33

## 2024-01-06 ASSESSMENT — PAIN - FUNCTIONAL ASSESSMENT
PAIN_FUNCTIONAL_ASSESSMENT: NONE - DENIES PAIN
PAIN_FUNCTIONAL_ASSESSMENT: NONE - DENIES PAIN

## 2024-01-06 NOTE — ED NOTES
Constant Observer Yes - Name: Marck Royal Observer Oriented yes   High risk patients are in line of sight at all times Yes   Excess equipment/medical supplies not necessary for the care of the patient removed Yes   All sharp or dangerous objects are removed from room: including but not limited to belts, pens & pencils, needles, medications, cosmetics, lighters, matches, nail files, watches, necklaces, glass objects, razors, razor blades, knives, aerosol sprays, drawstring pants, shoes, cords (telephone, call bells, etc.) cleaning wipes or other cleaning items, aluminum cans, not permanently attached wall décor Yes   Telephone/cell phone removed as well as TV remote (batteries can be swallowed) Yes   Patient belongings removed and labeled at nurses station Yes   Excess linen is removed from room Yes   All plastic bags are removed from the room and replaced with paper trash bags Yes   Patient is in paper scrubs or appropriate gown and using hospital socks with rubber soles Yes   No metal, hard eating utensils or hard plates are on meal tray Yes   Remove all cleaning agents used by Environmental Services Yes   If Crucifix is hanging on a nail, remove Crucifix as well as the nail Yes       *If any question above is answered \"No,\" documentation is required.

## 2024-01-06 NOTE — ED PROVIDER NOTES
Emergency Department Provider Note       PCP: Cande, Pcp   Age: 28 y.o.   Sex: male     DISPOSITION Eloped - Left Before Treatment Complete 01/06/2024 02:36:53 PM       ICD-10-CM    1. Homicidal ideation  R45.850           Medical Decision Making     Complexity of Problems Addressed:  1 or more acute illnesses that pose a threat to life or bodily function.     Data Reviewed and Analyzed:  I independently ordered and reviewed each unique test.  I reviewed external records: ED visit note from an outside group.  I reviewed external records: provider visit note from PCP.  I reviewed external records: provider visit note from outside specialist.           Discussion of management or test interpretation.  This is a 28-year-old male patient well-known to this and area ERs presenting with reports of suicidal ideation initially though describing concern for homicidal ideation at present.  Patient does have history of bipolar disorder and anxiety, he has been off medications for quite some time.  We will initiate basic blood work and psychiatric evaluation for further guidance.    Patient's drug screen is positive for multiple illicit substances.  Alcohol is negative.  Patient seen and evaluated by psychiatric provider who recommends inpatient placement for psychiatric stabilization.    7:00 AM The patient's care will be transitioned to Dr. Das.  At this time, the plan is to initiate daily, oral meds in an attempt to stabilize with plan to seek placement for inpatient mental health.  Please see that provider's documentation for further information.          Risk of Complications and/or Morbidity of Patient Management:  Drug therapy given requiring intensive monitoring for toxicity.  Shared medical decision making was utilized in creating the patients health plan today.    ED Course as of 01/07/24 0131   Sat Jan 06, 2024   0538 Patient was seen and evaluated by psychiatric provider on call, they deem him a risk to himself  have NOT CHANGED    Details   ibuprofen (IBU) 600 MG tablet Take 1 tablet by mouth every 8 hours as needed for Pain, Disp-30 tablet, R-0Print      clonazePAM (KLONOPIN) 1 MG tablet Take 1 tablet by mouth 2 times daily as needed for Anxiety for up to 2 days. Max Daily Amount: 2 mg, Disp-2 tablet, R-0Print      OLANZapine (ZYPREXA) 5 MG tablet Take 0.5-1 tablets by mouth nightly for 14 days, Disp-14 tablet, R-0Print              Results for orders placed or performed during the hospital encounter of 01/05/24   CBC with Auto Differential   Result Value Ref Range    WBC 6.3 4.3 - 11.1 K/uL    RBC 4.87 4.23 - 5.6 M/uL    Hemoglobin 13.5 (L) 13.6 - 17.2 g/dL    Hematocrit 42.0 41.1 - 50.3 %    MCV 86.2 82.0 - 102.0 FL    MCH 27.7 26.1 - 32.9 PG    MCHC 32.1 31.4 - 35.0 g/dL    RDW 14.2 11.9 - 14.6 %    Platelets 328 150 - 450 K/uL    MPV 10.0 9.4 - 12.3 FL    nRBC 0.00 0.0 - 0.2 K/uL    Differential Type AUTOMATED      Neutrophils % 54 43 - 78 %    Lymphocytes % 35 13 - 44 %    Monocytes % 8 4.0 - 12.0 %    Eosinophils % 3 0.5 - 7.8 %    Basophils % 1 0.0 - 2.0 %    Immature Granulocytes 0 0.0 - 5.0 %    Neutrophils Absolute 3.4 1.7 - 8.2 K/UL    Lymphocytes Absolute 2.2 0.5 - 4.6 K/UL    Monocytes Absolute 0.5 0.1 - 1.3 K/UL    Eosinophils Absolute 0.2 0.0 - 0.8 K/UL    Basophils Absolute 0.0 0.0 - 0.2 K/UL    Absolute Immature Granulocyte 0.0 0.0 - 0.5 K/UL   Comprehensive Metabolic Panel   Result Value Ref Range    Sodium 140 136 - 146 mmol/L    Potassium 3.8 3.5 - 5.1 mmol/L    Chloride 106 103 - 113 mmol/L    CO2 28 21 - 32 mmol/L    Anion Gap 6 2 - 11 mmol/L    Glucose 80 65 - 100 mg/dL    BUN 12 6 - 23 MG/DL    Creatinine 0.92 0.8 - 1.5 MG/DL    Est, Glom Filt Rate >60 >60 ml/min/1.73m2    Calcium 9.3 8.3 - 10.4 MG/DL    Total Bilirubin 0.2 0.2 - 1.1 MG/DL    ALT 27 12 - 65 U/L    AST 26 15 - 37 U/L    Alk Phosphatase 71 50 - 136 U/L    Total Protein 7.7 6.3 - 8.2 g/dL    Albumin 3.9 3.5 - 5.0 g/dL    Globulin 3.8

## 2024-01-06 NOTE — ED TRIAGE NOTES
Pt. A/ox4 and ambulatory. Pt. From [facility]. Per EMS pt. C/o suicidal ideations with no plan. Pt. With alcohol on board.

## 2024-01-06 NOTE — ED PROVIDER NOTES
Daily behavioral health rounds for January 6 at 9:34 AM  Patient awake calm cooperative wants to be taken out of 2 point restraints which seems reasonable apparently was fairly argumentative was too aggressive last night doing better now.  Patient is on commitment papers awaiting placement.  Patient voices no specific complaints     Valentin Das MD  01/06/24 0923

## 2024-01-07 NOTE — ED NOTES
Patient became angry after talking to the doctor and became both physically and verbally aggressive. He walked to the back exit of the hospital breaking computers along the way, pushing shelves over, cursing at the staff and making threats. Finally he took off out the ambulance bay doors with only in his paper scrubs pants on.

## 2024-01-07 NOTE — ED NOTES
Patient woke up and asked for water. I gave him water and proceeded to drink a sip and then throw it against the wall.

## 2024-05-20 ENCOUNTER — HOSPITAL ENCOUNTER (EMERGENCY)
Age: 29
Discharge: LEFT BEFORE DISCHARGE | End: 2024-05-20

## 2024-05-20 ENCOUNTER — APPOINTMENT (OUTPATIENT)
Dept: GENERAL RADIOLOGY | Age: 29
End: 2024-05-20

## 2024-05-20 VITALS
RESPIRATION RATE: 16 BRPM | HEIGHT: 74 IN | OXYGEN SATURATION: 96 % | HEART RATE: 130 BPM | SYSTOLIC BLOOD PRESSURE: 118 MMHG | WEIGHT: 170 LBS | TEMPERATURE: 97.8 F | BODY MASS INDEX: 21.82 KG/M2 | DIASTOLIC BLOOD PRESSURE: 103 MMHG

## 2024-05-20 DIAGNOSIS — M79.672 PAIN OF LEFT HEEL: Primary | ICD-10-CM

## 2024-05-20 PROCEDURE — 99282 EMERGENCY DEPT VISIT SF MDM: CPT

## 2024-05-20 RX ORDER — KETOROLAC TROMETHAMINE 30 MG/ML
30 INJECTION, SOLUTION INTRAMUSCULAR; INTRAVENOUS ONCE
Status: DISCONTINUED | OUTPATIENT
Start: 2024-05-20 | End: 2024-05-20 | Stop reason: HOSPADM

## 2024-05-20 RX ORDER — DEXAMETHASONE SODIUM PHOSPHATE 4 MG/ML
4 INJECTION, SOLUTION INTRA-ARTICULAR; INTRALESIONAL; INTRAMUSCULAR; INTRAVENOUS; SOFT TISSUE ONCE
Status: DISCONTINUED | OUTPATIENT
Start: 2024-05-20 | End: 2024-05-20 | Stop reason: HOSPADM

## 2024-05-20 RX ORDER — ACETAMINOPHEN 500 MG
1000 TABLET ORAL EVERY 6 HOURS PRN
Status: DISCONTINUED | OUTPATIENT
Start: 2024-05-20 | End: 2024-05-20 | Stop reason: HOSPADM

## 2024-05-20 ASSESSMENT — PAIN - FUNCTIONAL ASSESSMENT: PAIN_FUNCTIONAL_ASSESSMENT: 0-10

## 2024-05-20 ASSESSMENT — PAIN SCALES - GENERAL: PAINLEVEL_OUTOF10: 10

## 2024-05-20 NOTE — ED NOTES
Patient is refusing blood work and IV medications patient would like oral medication for his pain.      Vikki Lainez LPN  05/20/24 3977

## 2024-05-20 NOTE — ED PROVIDER NOTES
(traumatic brain injury) ([facility]) 2020        No past surgical history on file.     Social History     Socioeconomic History    Marital status: Single   Tobacco Use    Smoking status: Unknown   Vaping Use    Vaping Use: Every day    Substances: Nicotine   Substance and Sexual Activity    Alcohol use: Yes    Drug use: Defer     Types: Marijuana (Weed)    Sexual activity: Defer   Social History Narrative    ** Merged History Encounter **             Discharge Medication List as of 5/20/2024  2:07 PM        CONTINUE these medications which have NOT CHANGED    Details   ibuprofen (IBU) 600 MG tablet Take 1 tablet by mouth every 8 hours as needed for Pain, Disp-30 tablet, R-0Print      clonazePAM (KLONOPIN) 1 MG tablet Take 1 tablet by mouth 2 times daily as needed for Anxiety for up to 2 days. Max Daily Amount: 2 mg, Disp-2 tablet, R-0Print      OLANZapine (ZYPREXA) 5 MG tablet Take 0.5-1 tablets by mouth nightly for 14 days, Disp-14 tablet, R-0Print              No results found for any visits on 05/20/24.      No orders to display                No results for input(s): \"COVID19\" in the last 72 hours.    Voice dictation software was used during the making of this note.  This software is not perfect and grammatical and other typographical errors may be present.  This note has not been completely proofread for errors.     Marybeth Lane PA  05/20/24 2370

## 2024-05-20 NOTE — ED NOTES
Called for patient from lobby to medicate and patient not found in lobby, reported that he left.      Aleyda Miles, RN  05/20/24 2523

## 2024-05-20 NOTE — ED TRIAGE NOTES
Patient ambulatory to triage with CO left foot pain x 1 month. Denies injury. States \"I think its broken\"

## 2024-09-04 NOTE — ED NOTES
Chief complaint:   Chief Complaint   Patient presents with   • Office Visit   • Abdominal Pain     Epigastric discomfort with full feeling       Vitals:  Visit Vitals  /68 (BP Location: RUE - Right upper extremity, Patient Position: Sitting, Cuff Size: Regular)   Pulse 76   Temp 98.2 °F (36.8 °C) (Oral)   Resp 16   Ht 5' 7\" (1.702 m)   Wt 67.3 kg (148 lb 4.8 oz)   LMP 09/10/2014   BMI 23.23 kg/m²       HISTORY OF PRESENT ILLNESS     Nadia presents for evaluation of abdominal concern.    Nadia reports that a few weeks ago she noticed some epigastric fullness and worse with food. She had some nausea with that. She does have constipation. She took miralax and that did help with the bowels but she is still noticing fullness and bloating in the epigastric region. No vomiting. As long as she takes the miralax, the bowels are ok. She eats pretty healthy but probably doesn't get enough fruits and vegetables, she can't drink a lot of water at work. She is trying to exercise more, she tried to back off that and see if the pain was just a muscle issue but that didn't help.   She hasn't noticed anything that makes the pain worse. She tried omeprazole for a few days but didn't seem to notice any difference so she stopped taking it.         Other significant problems:  Patient Active Problem List    Diagnosis Date Noted   • Psoriasis 10/24/2023     Priority: Low   • Ventricular ectopy 10/24/2023     Priority: Low     Formatting of this note might be different from the original.  symptomatic     • Elevated coronary artery calcium score 10/24/2023     Priority: Low   • Lung nodule 10/24/2023     Priority: Low   • History of robot-assisted laparoscopic hysterectomy 10/13/2022     Priority: Low   • Osteoporosis 10/13/2022     Priority: Low   • Personal history of melanoma in-situ 10/13/2022     Priority: Low   • Mild anxiety 09/29/2017     Priority: Low   • Mild single current episode of major depressive disorder (CMD)  Pt resting in bed w/ eyes closed and no obvious signs or sx of distress.  [address], SURJIT  08/31/22 6327 09/29/2017     Priority: Low   • Colon polyps 08/08/2017     Priority: Low   • Family history of colon cancer 08/08/2017     Priority: Low       PAST MEDICAL, FAMILY AND SOCIAL HISTORY     Medications:  Current Outpatient Medications   Medication Sig Dispense Refill   • Cholecalciferol (VITAMIN D-3 PO) Take 50 mcg by mouth daily.     • B Complex Vitamins (VITAMIN B COMPLEX PO) Take by mouth daily.     • Ascorbic Acid (vitamin C) 500 MG tablet Take 500 mg by mouth daily.     • Calcium Carb-Cholecalciferol 600-12.5 MG-MCG Cap Take by mouth daily. Two capsules daily     • estradiol (ESTRACE) 0.1 MG/GM vaginal cream Place 1 g vaginally 2 days a week. 2 times a week before bed. 42.5 g 12     No current facility-administered medications for this visit.       Allergies:  ALLERGIES:  No Known Allergies    Past Medical  History/Surgeries:  Past Medical History:   Diagnosis Date   • Anemia    • Dysmenorrhea 08/2014    coming for hyst   • Melanoma  (CMD) 1994    RIGHT SHOULDER   • PONV (postoperative nausea and vomiting)        Past Surgical History:   Procedure Laterality Date   • ------------other-------------      LAPAROSCOPIC PROCEDURE   • Gynecologic cryosurgery  1996    laparoscopy   • Hysterectomy  2014   • Laparoscopic hysterectomy  08/27/2014    lapx TLH/high USLS   • Oophorectomy Bilateral 2014   • Tonsillectomy and adenoidectomy     • Urethral sling  08/27/2014       Family History:  Family History   Problem Relation Age of Onset   • Hypertension Mother    • Hyperlipidemia Mother    • Heart disease Father    • Hypertension Father    • Cancer, Colon Brother 57        colon CA, stage 4   • Diabetes Brother    • Cancer, Colon Paternal Grandmother 70       Social History:  Social History     Tobacco Use   • Smoking status: Never   • Smokeless tobacco: Never   • Tobacco comments:     PATIENT SMOKED OVER 20 YEARS AGO   Substance Use Topics   • Alcohol use: Yes     Alcohol/week: 0.0 - 1.0 standard drinks of alcohol      Comment: OCCASIONAL       REVIEW OF SYSTEMS     Review of Systems    PHYSICAL EXAM     Physical Exam  Constitutional:       General: She is not in acute distress.     Appearance: Normal appearance.   HENT:      Head: Normocephalic and atraumatic.   Cardiovascular:      Rate and Rhythm: Normal rate and regular rhythm.      Heart sounds: No murmur heard.  Pulmonary:      Effort: Pulmonary effort is normal.      Breath sounds: Normal breath sounds. No wheezing.   Abdominal:      General: Abdomen is flat. Bowel sounds are normal. There is no distension.      Palpations: Abdomen is soft.      Tenderness: There is abdominal tenderness in the epigastric area and periumbilical area. There is no guarding or rebound.      Hernia: No hernia is present.   Neurological:      Mental Status: She is alert.         ASSESSMENT/PLAN     1. Epigastric abdominal pain  Unclear etiology  Concern for possible cholecystitis vs pancreatitis vs gastritis vs ulcer vs GERD vs constipation related  Will start with labs  Will obtain abdominal US  Next steps pendign results   - CBC with Automated Differential; Future  - Comprehensive Metabolic Panel; Future  - Lipase; Future  - US ABDOMEN COMPLETE; Future

## 2024-09-08 ENCOUNTER — HOSPITAL ENCOUNTER (EMERGENCY)
Age: 29
Discharge: LEFT BEFORE DISCHARGE | End: 2024-09-09

## 2024-09-08 ENCOUNTER — HOSPITAL ENCOUNTER (EMERGENCY)
Age: 29
Discharge: HOME | End: 2024-09-08
Attending: EMERGENCY MEDICINE

## 2024-09-08 ENCOUNTER — APPOINTMENT (OUTPATIENT)
Dept: GENERAL RADIOLOGY | Age: 29
End: 2024-09-08

## 2024-09-08 VITALS
HEART RATE: 85 BPM | OXYGEN SATURATION: 97 % | BODY MASS INDEX: 19.25 KG/M2 | HEIGHT: 74 IN | SYSTOLIC BLOOD PRESSURE: 125 MMHG | RESPIRATION RATE: 18 BRPM | TEMPERATURE: 98.3 F | WEIGHT: 150 LBS | DIASTOLIC BLOOD PRESSURE: 74 MMHG

## 2024-09-08 VITALS
WEIGHT: 150 LBS | TEMPERATURE: 98.5 F | BODY MASS INDEX: 19.25 KG/M2 | DIASTOLIC BLOOD PRESSURE: 84 MMHG | HEART RATE: 117 BPM | RESPIRATION RATE: 16 BRPM | SYSTOLIC BLOOD PRESSURE: 129 MMHG | OXYGEN SATURATION: 95 % | HEIGHT: 74 IN

## 2024-09-08 DIAGNOSIS — F19.10 POLYSUBSTANCE ABUSE (HCC): ICD-10-CM

## 2024-09-08 DIAGNOSIS — R11.0 NAUSEA: Primary | ICD-10-CM

## 2024-09-08 DIAGNOSIS — F41.1 ANXIETY STATE: Primary | ICD-10-CM

## 2024-09-08 PROBLEM — F15.20 SEVERE STIMULANT USE DISORDER (HCC): Status: ACTIVE | Noted: 2021-09-22

## 2024-09-08 PROBLEM — F15.20 METHAMPHETAMINE USE DISORDER, SEVERE, DEPENDENCE (HCC): Status: ACTIVE | Noted: 2023-09-06

## 2024-09-08 PROBLEM — F10.20 ALCOHOL USE DISORDER, MODERATE, DEPENDENCE (HCC): Status: ACTIVE | Noted: 2024-05-21

## 2024-09-08 PROBLEM — F19.20 POLYSUBSTANCE DEPENDENCE INCLUDING OPIOID TYPE DRUG WITHOUT COMPLICATION, EPISODIC ABUSE (HCC): Status: ACTIVE | Noted: 2023-11-14

## 2024-09-08 LAB
AMPHET UR QL SCN: POSITIVE
ANION GAP SERPL CALC-SCNC: 15 MMOL/L (ref 9–18)
APAP SERPL-MCNC: <5 UG/ML (ref 10–30)
BASOPHILS # BLD: 0 K/UL (ref 0–0.2)
BASOPHILS NFR BLD: 0 % (ref 0–2)
BENZODIAZ UR QL: NEGATIVE
BUN SERPL-MCNC: 8 MG/DL (ref 6–23)
CALCIUM SERPL-MCNC: 9.5 MG/DL (ref 8.8–10.2)
CANNABINOIDS UR QL SCN: POSITIVE
CHLORIDE SERPL-SCNC: 102 MMOL/L (ref 98–107)
CO2 SERPL-SCNC: 22 MMOL/L (ref 20–28)
COCAINE UR QL SCN: NEGATIVE
CREAT SERPL-MCNC: 0.93 MG/DL (ref 0.8–1.3)
DIFFERENTIAL METHOD BLD: ABNORMAL
EOSINOPHIL # BLD: 0.2 K/UL (ref 0–0.8)
EOSINOPHIL NFR BLD: 2 % (ref 0.5–7.8)
ERYTHROCYTE [DISTWIDTH] IN BLOOD BY AUTOMATED COUNT: 13.8 % (ref 11.9–14.6)
ERYTHROCYTE [DISTWIDTH] IN BLOOD BY AUTOMATED COUNT: 13.9 % (ref 11.9–14.6)
ETHANOL SERPL-MCNC: <11 MG/DL (ref 0–0.08)
GLUCOSE SERPL-MCNC: 94 MG/DL (ref 70–99)
HCT VFR BLD AUTO: 33.5 % (ref 41.1–50.3)
HCT VFR BLD AUTO: 37.4 % (ref 41.1–50.3)
HGB BLD-MCNC: 11.4 G/DL (ref 13.6–17.2)
HGB BLD-MCNC: 12.4 G/DL (ref 13.6–17.2)
IMM GRANULOCYTES # BLD AUTO: 0 K/UL (ref 0–0.5)
IMM GRANULOCYTES NFR BLD AUTO: 0 % (ref 0–5)
LYMPHOCYTES # BLD: 2.3 K/UL (ref 0.5–4.6)
LYMPHOCYTES NFR BLD: 24 % (ref 13–44)
MCH RBC QN AUTO: 27.1 PG (ref 26.1–32.9)
MCH RBC QN AUTO: 27.5 PG (ref 26.1–32.9)
MCHC RBC AUTO-ENTMCNC: 33.2 G/DL (ref 31.4–35)
MCHC RBC AUTO-ENTMCNC: 34 G/DL (ref 31.4–35)
MCV RBC AUTO: 80.7 FL (ref 82–102)
MCV RBC AUTO: 81.7 FL (ref 82–102)
MONOCYTES # BLD: 1 K/UL (ref 0.1–1.3)
MONOCYTES NFR BLD: 11 % (ref 4–12)
NEUTS SEG # BLD: 5.8 K/UL (ref 1.7–8.2)
NEUTS SEG NFR BLD: 62 % (ref 43–78)
NRBC # BLD: 0 K/UL (ref 0–0.2)
NRBC # BLD: 0 K/UL (ref 0–0.2)
OPIATES UR QL: NEGATIVE
PLATELET # BLD AUTO: 328 K/UL (ref 150–450)
PLATELET # BLD AUTO: 357 K/UL (ref 150–450)
PMV BLD AUTO: 10 FL (ref 9.4–12.3)
PMV BLD AUTO: 9.9 FL (ref 9.4–12.3)
POTASSIUM SERPL-SCNC: 3.6 MMOL/L (ref 3.5–5.1)
RBC # BLD AUTO: 4.15 M/UL (ref 4.23–5.6)
RBC # BLD AUTO: 4.58 M/UL (ref 4.23–5.6)
SALICYLATES SERPL-MCNC: <0.5 MG/DL
SODIUM SERPL-SCNC: 139 MMOL/L (ref 136–145)
WBC # BLD AUTO: 8.4 K/UL (ref 4.3–11.1)
WBC # BLD AUTO: 9.4 K/UL (ref 4.3–11.1)

## 2024-09-08 PROCEDURE — 80048 BASIC METABOLIC PNL TOTAL CA: CPT

## 2024-09-08 PROCEDURE — 80143 DRUG ASSAY ACETAMINOPHEN: CPT

## 2024-09-08 PROCEDURE — 6370000000 HC RX 637 (ALT 250 FOR IP): Performed by: EMERGENCY MEDICINE

## 2024-09-08 PROCEDURE — 85027 COMPLETE CBC AUTOMATED: CPT

## 2024-09-08 PROCEDURE — 85025 COMPLETE CBC W/AUTO DIFF WBC: CPT

## 2024-09-08 PROCEDURE — 83690 ASSAY OF LIPASE: CPT

## 2024-09-08 PROCEDURE — 80179 DRUG ASSAY SALICYLATE: CPT

## 2024-09-08 PROCEDURE — 6360000002 HC RX W HCPCS

## 2024-09-08 PROCEDURE — 99283 EMERGENCY DEPT VISIT LOW MDM: CPT

## 2024-09-08 PROCEDURE — 80307 DRUG TEST PRSMV CHEM ANLYZR: CPT

## 2024-09-08 PROCEDURE — 96374 THER/PROPH/DIAG INJ IV PUSH: CPT

## 2024-09-08 PROCEDURE — 82077 ASSAY SPEC XCP UR&BREATH IA: CPT

## 2024-09-08 PROCEDURE — 99284 EMERGENCY DEPT VISIT MOD MDM: CPT

## 2024-09-08 PROCEDURE — 80053 COMPREHEN METABOLIC PANEL: CPT

## 2024-09-08 PROCEDURE — 74022 RADEX COMPL AQT ABD SERIES: CPT

## 2024-09-08 RX ORDER — ONDANSETRON 2 MG/ML
4 INJECTION INTRAMUSCULAR; INTRAVENOUS
Status: COMPLETED | OUTPATIENT
Start: 2024-09-08 | End: 2024-09-08

## 2024-09-08 RX ORDER — RISPERIDONE 1 MG/1
1 TABLET ORAL
Status: COMPLETED | OUTPATIENT
Start: 2024-09-08 | End: 2024-09-08

## 2024-09-08 RX ORDER — HYDROXYZINE PAMOATE 25 MG/1
25 CAPSULE ORAL 3 TIMES DAILY PRN
Qty: 30 CAPSULE | Refills: 0 | Status: SHIPPED | OUTPATIENT
Start: 2024-09-08

## 2024-09-08 RX ADMIN — RISPERIDONE 1 MG: 1 TABLET, FILM COATED ORAL at 00:44

## 2024-09-08 RX ADMIN — ONDANSETRON 4 MG: 2 INJECTION, SOLUTION INTRAMUSCULAR; INTRAVENOUS at 23:24

## 2024-09-08 ASSESSMENT — LIFESTYLE VARIABLES
HOW OFTEN DO YOU HAVE A DRINK CONTAINING ALCOHOL: MONTHLY OR LESS
HOW OFTEN DO YOU HAVE A DRINK CONTAINING ALCOHOL: MONTHLY OR LESS
HOW MANY STANDARD DRINKS CONTAINING ALCOHOL DO YOU HAVE ON A TYPICAL DAY: 1 OR 2

## 2024-09-08 ASSESSMENT — PAIN - FUNCTIONAL ASSESSMENT: PAIN_FUNCTIONAL_ASSESSMENT: NONE - DENIES PAIN

## 2024-09-09 LAB
ALBUMIN SERPL-MCNC: 3.7 G/DL (ref 3.5–5)
ALBUMIN/GLOB SERPL: 1.2 (ref 1–1.9)
ALP SERPL-CCNC: 56 U/L (ref 40–129)
ALT SERPL-CCNC: 34 U/L (ref 12–65)
ANION GAP SERPL CALC-SCNC: 13 MMOL/L (ref 9–18)
AST SERPL-CCNC: 56 U/L (ref 15–37)
BILIRUB SERPL-MCNC: 0.3 MG/DL (ref 0–1.2)
BUN SERPL-MCNC: 8 MG/DL (ref 6–23)
CALCIUM SERPL-MCNC: 8.6 MG/DL (ref 8.8–10.2)
CHLORIDE SERPL-SCNC: 105 MMOL/L (ref 98–107)
CO2 SERPL-SCNC: 22 MMOL/L (ref 20–28)
CREAT SERPL-MCNC: 0.82 MG/DL (ref 0.8–1.3)
GLOBULIN SER CALC-MCNC: 3.1 G/DL (ref 2.3–3.5)
GLUCOSE SERPL-MCNC: 102 MG/DL (ref 70–99)
LIPASE SERPL-CCNC: 15 U/L (ref 13–60)
POTASSIUM SERPL-SCNC: 3.6 MMOL/L (ref 3.5–5.1)
PROT SERPL-MCNC: 6.8 G/DL (ref 6.3–8.2)
SODIUM SERPL-SCNC: 140 MMOL/L (ref 136–145)

## 2024-09-11 ENCOUNTER — HOSPITAL ENCOUNTER (EMERGENCY)
Age: 29
Discharge: HOME | End: 2024-09-11
Attending: EMERGENCY MEDICINE

## 2024-09-11 VITALS
HEART RATE: 116 BPM | OXYGEN SATURATION: 97 % | TEMPERATURE: 98.7 F | SYSTOLIC BLOOD PRESSURE: 130 MMHG | BODY MASS INDEX: 19.25 KG/M2 | RESPIRATION RATE: 16 BRPM | HEIGHT: 74 IN | DIASTOLIC BLOOD PRESSURE: 94 MMHG | WEIGHT: 150 LBS

## 2024-09-11 DIAGNOSIS — R51.9 ACUTE NONINTRACTABLE HEADACHE, UNSPECIFIED HEADACHE TYPE: Primary | ICD-10-CM

## 2024-09-11 PROCEDURE — 99283 EMERGENCY DEPT VISIT LOW MDM: CPT

## 2024-09-11 PROCEDURE — 6370000000 HC RX 637 (ALT 250 FOR IP): Performed by: EMERGENCY MEDICINE

## 2024-09-11 RX ORDER — ACETAMINOPHEN 500 MG
1000 TABLET ORAL
Status: COMPLETED | OUTPATIENT
Start: 2024-09-11 | End: 2024-09-11

## 2024-09-11 RX ADMIN — ACETAMINOPHEN 1000 MG: 500 TABLET ORAL at 01:29

## 2024-09-11 ASSESSMENT — PAIN DESCRIPTION - ORIENTATION: ORIENTATION: ANTERIOR

## 2024-09-11 ASSESSMENT — PAIN DESCRIPTION - LOCATION: LOCATION: HEAD

## 2024-09-11 ASSESSMENT — PAIN - FUNCTIONAL ASSESSMENT: PAIN_FUNCTIONAL_ASSESSMENT: 0-10

## 2024-09-11 ASSESSMENT — PAIN SCALES - GENERAL
PAINLEVEL_OUTOF10: 5
PAINLEVEL_OUTOF10: 5

## 2024-09-11 ASSESSMENT — PAIN DESCRIPTION - DESCRIPTORS: DESCRIPTORS: ACHING

## 2024-11-10 ENCOUNTER — HOSPITAL ENCOUNTER (EMERGENCY)
Age: 29
Discharge: HOME | End: 2024-11-10

## 2024-11-10 VITALS
DIASTOLIC BLOOD PRESSURE: 84 MMHG | TEMPERATURE: 97.6 F | HEART RATE: 75 BPM | SYSTOLIC BLOOD PRESSURE: 119 MMHG | OXYGEN SATURATION: 100 % | RESPIRATION RATE: 20 BRPM

## 2024-11-10 DIAGNOSIS — F15.10 AMPHETAMINE ABUSE (HCC): Primary | ICD-10-CM

## 2024-11-10 DIAGNOSIS — Z76.5 MALINGERING: ICD-10-CM

## 2024-11-10 PROCEDURE — 99283 EMERGENCY DEPT VISIT LOW MDM: CPT

## 2024-11-10 PROCEDURE — 6370000000 HC RX 637 (ALT 250 FOR IP): Performed by: PHYSICIAN ASSISTANT

## 2024-11-10 RX ORDER — RISPERIDONE 1 MG/1
1 TABLET ORAL
Status: COMPLETED | OUTPATIENT
Start: 2024-11-10 | End: 2024-11-10

## 2024-11-10 RX ADMIN — RISPERIDONE 1 MG: 1 TABLET, FILM COATED ORAL at 03:55

## 2024-11-10 ASSESSMENT — PAIN - FUNCTIONAL ASSESSMENT: PAIN_FUNCTIONAL_ASSESSMENT: NONE - DENIES PAIN

## 2024-11-10 ASSESSMENT — LIFESTYLE VARIABLES
HOW OFTEN DO YOU HAVE A DRINK CONTAINING ALCOHOL: 2-4 TIMES A MONTH
HOW MANY STANDARD DRINKS CONTAINING ALCOHOL DO YOU HAVE ON A TYPICAL DAY: 10 OR MORE

## 2024-11-10 NOTE — ED PROVIDER NOTES
Emergency Department Provider Note       PCP: No, Pcp   Age: 29 y.o.   Sex: male     DISPOSITION Decision To Discharge 11/10/2024 04:18:10 AM            ICD-10-CM    1. Amphetamine abuse (HCC)  F15.10       2. Malingering  Z76.5           Medical Decision Making     29-year-old male with frequent ER visits.  Has known methamphetamine abuse and malingering.  He called EMS almost instantaneously upon being discharged from the local [facility].  He states he does not feel safe in the community because he is think there is people following him.  He supposed to take Haldol but he does not take it and he states that he does think it actually helps but he does not take it because he does not like it.  Presents here requesting placement into [facility].  He denies HI or SI.  He states he last used methamphetamine yesterday which is a direct contradiction to what he had said previously to nursing staff and EMS.  Given light of his history today and chart review, I do not think he has an emergent or life-threatening complaints or process occurring.  I suspect malingering as a large part of his presentation today.     1 acute, uncomplicated illness or injury.  Over the counter drug management performed.  Patient was discharged risks and benefits of hospitalization were considered.  Shared medical decision making was utilized in creating the patients health plan today.  I independently ordered and reviewed each unique test.    I reviewed external records: ED visit note from an outside group.  I reviewed external records: provider visit note from PCP.  I reviewed external records: provider visit note from outside specialist.  I reviewed external records: previous lab results from outside ED.  I reviewed external records: previous imaging study including radiologist interpretation.     History     29-year-old male, well-known to this department for repeated ER visits. 15th visit in last 6 weeks.  Visits are often for stimulant

## 2024-11-10 NOTE — ED TRIAGE NOTES
Patient arrives via EMS from [facility] c/o \"need help with my mental health.. get clean from meth,\" last use Friday. VSS.

## 2024-11-10 NOTE — DISCHARGE INSTRUCTIONS
I have attached information below that we will assist you in getting yourself into rehabilitation clinics such as [facility].  Please discontinue your methamphetamine abuse.    [facility]   [phone]   They have multiple resources to help you.  Please call.  [email or web address]     Other local resources that are available are:       [facility]    [phone]  [email or web address] for inpatient and outpatient substance abuse issues.    [facility] [phone]  Medication assisted treatment    [facility]  [phone]     Suicide Hotline   4-485-ZMXIVJB     Narcotics Anonymous   www.na.org  Alcoholics Anonymous  www.aa.org

## 2024-11-21 ENCOUNTER — HOSPITAL ENCOUNTER (EMERGENCY)
Age: 29
Discharge: HOME | End: 2024-11-21
Attending: EMERGENCY MEDICINE

## 2024-11-21 VITALS
TEMPERATURE: 97.8 F | BODY MASS INDEX: 17.97 KG/M2 | WEIGHT: 140 LBS | SYSTOLIC BLOOD PRESSURE: 133 MMHG | DIASTOLIC BLOOD PRESSURE: 75 MMHG | RESPIRATION RATE: 16 BRPM | HEIGHT: 74 IN | HEART RATE: 93 BPM | OXYGEN SATURATION: 98 %

## 2024-11-21 DIAGNOSIS — Z76.5 MALINGERING: ICD-10-CM

## 2024-11-21 DIAGNOSIS — R45.851 SUICIDAL IDEATION: Primary | ICD-10-CM

## 2024-11-21 LAB
ALBUMIN SERPL-MCNC: 4.1 G/DL (ref 3.5–5)
ALBUMIN/GLOB SERPL: 1.2 (ref 1–1.9)
ALP SERPL-CCNC: 60 U/L (ref 40–129)
ALT SERPL-CCNC: 25 U/L (ref 8–55)
AMPHET UR QL SCN: POSITIVE
ANION GAP SERPL CALC-SCNC: 12 MMOL/L (ref 7–16)
APAP SERPL-MCNC: <5 UG/ML (ref 10–30)
AST SERPL-CCNC: 40 U/L (ref 15–37)
BASOPHILS # BLD: 0 K/UL (ref 0–0.2)
BASOPHILS NFR BLD: 1 % (ref 0–2)
BENZODIAZ UR QL: NEGATIVE
BILIRUB SERPL-MCNC: 0.3 MG/DL (ref 0–1.2)
BUN SERPL-MCNC: 10 MG/DL (ref 6–23)
CALCIUM SERPL-MCNC: 9.5 MG/DL (ref 8.8–10.2)
CANNABINOIDS UR QL SCN: POSITIVE
CHLORIDE SERPL-SCNC: 103 MMOL/L (ref 98–107)
CO2 SERPL-SCNC: 24 MMOL/L (ref 20–29)
COCAINE UR QL SCN: NEGATIVE
CREAT SERPL-MCNC: 0.77 MG/DL (ref 0.8–1.3)
DIFFERENTIAL METHOD BLD: NORMAL
EOSINOPHIL # BLD: 0.2 K/UL (ref 0–0.8)
EOSINOPHIL NFR BLD: 3 % (ref 0.5–7.8)
ERYTHROCYTE [DISTWIDTH] IN BLOOD BY AUTOMATED COUNT: 14.6 % (ref 11.9–14.6)
ETHANOL SERPL-MCNC: <11 MG/DL (ref 0–0.08)
GLOBULIN SER CALC-MCNC: 3.4 G/DL (ref 2.3–3.5)
GLUCOSE SERPL-MCNC: 95 MG/DL (ref 70–99)
HCT VFR BLD AUTO: 42.2 % (ref 41.1–50.3)
HGB BLD-MCNC: 13.8 G/DL (ref 13.6–17.2)
IMM GRANULOCYTES # BLD AUTO: 0 K/UL (ref 0–0.5)
IMM GRANULOCYTES NFR BLD AUTO: 0 % (ref 0–5)
LYMPHOCYTES # BLD: 2.1 K/UL (ref 0.5–4.6)
LYMPHOCYTES NFR BLD: 33 % (ref 13–44)
MCH RBC QN AUTO: 27.2 PG (ref 26.1–32.9)
MCHC RBC AUTO-ENTMCNC: 32.7 G/DL (ref 31.4–35)
MCV RBC AUTO: 83.2 FL (ref 82–102)
MONOCYTES # BLD: 0.7 K/UL (ref 0.1–1.3)
MONOCYTES NFR BLD: 12 % (ref 4–12)
NEUTS SEG # BLD: 3.2 K/UL (ref 1.7–8.2)
NEUTS SEG NFR BLD: 51 % (ref 43–78)
NRBC # BLD: 0 K/UL (ref 0–0.2)
OPIATES UR QL: NEGATIVE
PLATELET # BLD AUTO: 355 K/UL (ref 150–450)
PMV BLD AUTO: 9.5 FL (ref 9.4–12.3)
POTASSIUM SERPL-SCNC: 4.6 MMOL/L (ref 3.5–5.1)
PROT SERPL-MCNC: 7.6 G/DL (ref 6.3–8.2)
RBC # BLD AUTO: 5.07 M/UL (ref 4.23–5.6)
SODIUM SERPL-SCNC: 138 MMOL/L (ref 136–145)
WBC # BLD AUTO: 6.3 K/UL (ref 4.3–11.1)

## 2024-11-21 PROCEDURE — 6370000000 HC RX 637 (ALT 250 FOR IP): Performed by: EMERGENCY MEDICINE

## 2024-11-21 PROCEDURE — 80307 DRUG TEST PRSMV CHEM ANLYZR: CPT

## 2024-11-21 PROCEDURE — 80143 DRUG ASSAY ACETAMINOPHEN: CPT

## 2024-11-21 PROCEDURE — 82077 ASSAY SPEC XCP UR&BREATH IA: CPT

## 2024-11-21 PROCEDURE — 80053 COMPREHEN METABOLIC PANEL: CPT

## 2024-11-21 PROCEDURE — 99285 EMERGENCY DEPT VISIT HI MDM: CPT

## 2024-11-21 PROCEDURE — 85025 COMPLETE CBC W/AUTO DIFF WBC: CPT

## 2024-11-21 RX ORDER — DIVALPROEX SODIUM 250 MG/1
500 TABLET, FILM COATED, EXTENDED RELEASE ORAL
Status: COMPLETED | OUTPATIENT
Start: 2024-11-21 | End: 2024-11-21

## 2024-11-21 RX ORDER — OLANZAPINE 5 MG/1
2.5-5 TABLET ORAL NIGHTLY
Qty: 14 TABLET | Refills: 0 | Status: SHIPPED | OUTPATIENT
Start: 2024-11-21 | End: 2024-12-05

## 2024-11-21 RX ORDER — DIVALPROEX SODIUM 500 MG/1
500 TABLET, FILM COATED, EXTENDED RELEASE ORAL DAILY
Qty: 30 TABLET | Refills: 3 | Status: SHIPPED | OUTPATIENT
Start: 2024-11-21

## 2024-11-21 RX ORDER — OLANZAPINE 5 MG/1
5 TABLET ORAL
Status: COMPLETED | OUTPATIENT
Start: 2024-11-21 | End: 2024-11-21

## 2024-11-21 RX ORDER — RISPERIDONE 1 MG/1
1 TABLET ORAL
Status: COMPLETED | OUTPATIENT
Start: 2024-11-21 | End: 2024-11-21

## 2024-11-21 RX ORDER — RISPERIDONE 1 MG/1
1 TABLET ORAL 2 TIMES DAILY
Qty: 60 TABLET | Refills: 0 | Status: SHIPPED | OUTPATIENT
Start: 2024-11-21

## 2024-11-21 RX ADMIN — OLANZAPINE 5 MG: 5 TABLET, FILM COATED ORAL at 22:49

## 2024-11-21 RX ADMIN — RISPERIDONE 1 MG: 1 TABLET, FILM COATED ORAL at 22:49

## 2024-11-21 RX ADMIN — DIVALPROEX SODIUM 500 MG: 250 TABLET, EXTENDED RELEASE ORAL at 22:49

## 2024-11-21 ASSESSMENT — LIFESTYLE VARIABLES
HOW OFTEN DO YOU HAVE A DRINK CONTAINING ALCOHOL: NEVER
HOW MANY STANDARD DRINKS CONTAINING ALCOHOL DO YOU HAVE ON A TYPICAL DAY: 1 OR 2

## 2024-11-21 ASSESSMENT — PAIN - FUNCTIONAL ASSESSMENT: PAIN_FUNCTIONAL_ASSESSMENT: 0-10

## 2024-11-21 ASSESSMENT — PAIN SCALES - GENERAL: PAINLEVEL_OUTOF10: 0

## 2024-11-21 NOTE — ED TRIAGE NOTES
Pt coming from [facility] department via [facility]. Pt states he is homeless, cold, and wants to kill himself. Pt denies plan

## 2024-11-22 ENCOUNTER — HOSPITAL ENCOUNTER (EMERGENCY)
Age: 29
Discharge: HOME | End: 2024-11-22
Attending: GENERAL PRACTICE

## 2024-11-22 VITALS
OXYGEN SATURATION: 98 % | SYSTOLIC BLOOD PRESSURE: 138 MMHG | RESPIRATION RATE: 16 BRPM | HEART RATE: 112 BPM | TEMPERATURE: 98 F | DIASTOLIC BLOOD PRESSURE: 88 MMHG

## 2024-11-22 PROCEDURE — 4500000002 HC ER NO CHARGE

## 2024-11-22 PROCEDURE — 99283 EMERGENCY DEPT VISIT LOW MDM: CPT

## 2024-11-22 NOTE — ED NOTES
Patient mobility status  with no difficulty.     I have reviewed discharge instructions with the patient.  The patient verbalized understanding.    Patient left ED via Discharge Method: ambulatory to cold shelter with  uber .    Opportunity for questions and clarification provided.     Patient given 3 scripts.           Jovani Bethea, RN  11/21/24 5922       Jovani Bethea RN  11/21/24 1725

## 2024-11-22 NOTE — ED TRIAGE NOTES
Pt arrives via [facility] coming from a mental health facility in which he was sent. Pt told they couldn't help him until tomorrow

## 2024-11-22 NOTE — ED PROVIDER NOTES
Emergency Department Provider Note       PCP: No, Pcp   Age: 29 y.o.   Sex: male     DISPOSITION Decision To Discharge 11/21/2024 10:22:13 PM    ICD-10-CM    1. Suicidal ideation  R45.851     Chronic      2. Malingering  Z76.5           Medical Decision Making   Claims of suicidal ideation in a patient with multiple visits for suicidal ideations with apparent history of noncompliance with both medication and follow-up.  Psychiatric screening.  Psychiatry has seen the patient and feels patient does not require inpatient treatment.     1 or more acute illnesses that pose a threat to life or bodily function.   Prescription drug management performed.  Diagnosis or care significantly limited by social determinants of health homelessness, poor compliance.  Refusal to follow-up with outpatient treatment plans.  I independently ordered and reviewed each unique test.    I reviewed external records: ED visit note from an outside group.  See HPI  The patients assessment required an independent historian: EMS.  The reason they were needed is important historical information not provided by the patient.      History     29-year-old male presents by EMS complaining of suicidal ideations.  Does not have a clear plan.  Tells me he has had suicidal ideation at least for a number of months and feels like it is worse.  States he is not on any medications.  States he has a remote history of traumatic brain injury but does not taking medications for that.  States he used to be on some medications but not anymore.  Denies any overdose.  No hallucinations.  Patient is homeless.  I reviewed records.  Patient's had for 5 emergency department visits for claims of suicidal ideations.  Most times he is felt to be malingering.  He was seen in another facility with complaints of suicidal ideations today.  Seen by psychiatry.  They felt he did not meet admission criteria.  It was noted that he should have prescriptions for Depakote, Risperdal

## 2024-11-22 NOTE — ED NOTES
Constant Observer Yes - Name: Agustin Royal Observer Oriented yes   High risk patients are in line of sight at all times Yes   Excess equipment/medical supplies not necessary for the care of the patient removed Yes   All sharp or dangerous objects are removed from room: including but not limited to belts, pens & pencils, needles, medications, cosmetics, lighters, matches, nail files, watches, necklaces, glass objects, razors, razor blades, knives, aerosol sprays, drawstring pants, shoes, cords (telephone, call bells, etc.) cleaning wipes or other cleaning items, aluminum cans, not permanently attached wall décor Yes   Telephone/cell phone removed as well as TV remote (batteries can be swallowed) Yes   Patient belongings removed and labeled at nurses station Yes   Excess linen is removed from room Yes   All plastic bags are removed from the room and replaced with paper trash bags Yes   Patient is in paper scrubs or appropriate gown and using hospital socks with rubber soles Yes   No metal, hard eating utensils or hard plates are on meal tray Yes   Remove all cleaning agents used by Environmental Services Yes   If Crucifix is hanging on a nail, remove Crucifix as well as the nail Yes       *If any question above is answered \"No,\" documentation is required.       Jovani Bethea RN  11/21/24 2010

## 2024-11-22 NOTE — DISCHARGE INSTRUCTIONS
I have put down phone numbers for ongoing primary care as well as [facility].  It would be worthwhile to take your previously prescribed medications as directed and call these follow-up clinics for appointments.

## 2024-12-03 ENCOUNTER — HOSPITAL ENCOUNTER (EMERGENCY)
Age: 29
Discharge: HOME | End: 2024-12-03
Attending: STUDENT IN AN ORGANIZED HEALTH CARE EDUCATION/TRAINING PROGRAM

## 2024-12-03 VITALS
TEMPERATURE: 98.3 F | OXYGEN SATURATION: 100 % | DIASTOLIC BLOOD PRESSURE: 80 MMHG | SYSTOLIC BLOOD PRESSURE: 132 MMHG | WEIGHT: 125 LBS | BODY MASS INDEX: 16.04 KG/M2 | HEART RATE: 123 BPM | RESPIRATION RATE: 20 BRPM | HEIGHT: 74 IN

## 2024-12-03 DIAGNOSIS — G44.209 TENSION HEADACHE: Primary | ICD-10-CM

## 2024-12-03 PROCEDURE — 2580000003 HC RX 258: Performed by: STUDENT IN AN ORGANIZED HEALTH CARE EDUCATION/TRAINING PROGRAM

## 2024-12-03 PROCEDURE — 96361 HYDRATE IV INFUSION ADD-ON: CPT

## 2024-12-03 PROCEDURE — 6370000000 HC RX 637 (ALT 250 FOR IP): Performed by: STUDENT IN AN ORGANIZED HEALTH CARE EDUCATION/TRAINING PROGRAM

## 2024-12-03 PROCEDURE — 96374 THER/PROPH/DIAG INJ IV PUSH: CPT

## 2024-12-03 PROCEDURE — 6360000002 HC RX W HCPCS: Performed by: STUDENT IN AN ORGANIZED HEALTH CARE EDUCATION/TRAINING PROGRAM

## 2024-12-03 PROCEDURE — 96375 TX/PRO/DX INJ NEW DRUG ADDON: CPT

## 2024-12-03 PROCEDURE — 99284 EMERGENCY DEPT VISIT MOD MDM: CPT

## 2024-12-03 RX ORDER — DIPHENHYDRAMINE HCL 25 MG
25 CAPSULE ORAL
Status: COMPLETED | OUTPATIENT
Start: 2024-12-03 | End: 2024-12-03

## 2024-12-03 RX ORDER — SODIUM CHLORIDE, SODIUM LACTATE, POTASSIUM CHLORIDE, AND CALCIUM CHLORIDE .6; .31; .03; .02 G/100ML; G/100ML; G/100ML; G/100ML
1000 INJECTION, SOLUTION INTRAVENOUS ONCE
Status: COMPLETED | OUTPATIENT
Start: 2024-12-03 | End: 2024-12-03

## 2024-12-03 RX ORDER — PROCHLORPERAZINE EDISYLATE 5 MG/ML
10 INJECTION INTRAMUSCULAR; INTRAVENOUS
Status: COMPLETED | OUTPATIENT
Start: 2024-12-03 | End: 2024-12-03

## 2024-12-03 RX ORDER — KETOROLAC TROMETHAMINE 15 MG/ML
15 INJECTION, SOLUTION INTRAMUSCULAR; INTRAVENOUS
Status: COMPLETED | OUTPATIENT
Start: 2024-12-03 | End: 2024-12-03

## 2024-12-03 RX ADMIN — DIPHENHYDRAMINE HYDROCHLORIDE 25 MG: 25 CAPSULE ORAL at 04:47

## 2024-12-03 RX ADMIN — PROCHLORPERAZINE EDISYLATE 10 MG: 5 INJECTION INTRAMUSCULAR; INTRAVENOUS at 04:47

## 2024-12-03 RX ADMIN — KETOROLAC TROMETHAMINE 15 MG: 15 INJECTION, SOLUTION INTRAMUSCULAR; INTRAVENOUS at 04:47

## 2024-12-03 RX ADMIN — SODIUM CHLORIDE, POTASSIUM CHLORIDE, SODIUM LACTATE AND CALCIUM CHLORIDE 1000 ML: 600; 310; 30; 20 INJECTION, SOLUTION INTRAVENOUS at 04:46

## 2024-12-03 ASSESSMENT — ENCOUNTER SYMPTOMS
ABDOMINAL PAIN: 0
EYE REDNESS: 0
EYE PAIN: 0
SHORTNESS OF BREATH: 0
VOMITING: 0
NAUSEA: 0
COUGH: 0
SORE THROAT: 0

## 2024-12-03 ASSESSMENT — PAIN - FUNCTIONAL ASSESSMENT: PAIN_FUNCTIONAL_ASSESSMENT: 0-10

## 2024-12-03 ASSESSMENT — PAIN DESCRIPTION - LOCATION: LOCATION: HEAD

## 2024-12-03 ASSESSMENT — PAIN SCALES - GENERAL
PAINLEVEL_OUTOF10: 6
PAINLEVEL_OUTOF10: 6

## 2024-12-03 NOTE — ED PROVIDER NOTES
Emergency Department Provider Note       PCP: No primary care provider on file.   Age: 29 y.o.   Sex: male     DISPOSITION Decision To Discharge 12/03/2024 05:25:52 AM    ICD-10-CM    1. Tension headache  G44.209 LT[facility], [facility]          Medical Decision Making     Rogelio Lazo Jr. 29 y.o. male, presents with headache for past 2 to 3 days.  Gradual onset.  Is not worst headache of life. Vital signs demonstrate tachycardia initially in the 120s, improved throughout ED stay down to around 108 during time of discharge.. PEx demonstrates no focal neurologic deficits.  Patient states that headache is similar migraine to what he is experienced recently, do not believe that CT is warranted at this time.    I considered the following life-threatening or otherwise serious diagnoses: epidural, subdural, subarachnoid, and intraparencymal hemorrhage, temporal arteritis, acute closed glaucoma, meningitis, encephalitis, CO poisoning, and tumor or mass but feel they are less likely considering patient history of HA being inconsistent with subarachnoid hemorrhage, lack of constitutional response and jaw claudication, normal pupillary response, non-stiff neck and lack of fever, lack of focal neuro deficiencies, and patient history inconsistent with CO exposure.  Patient does not want labs drawn today, I told him with his tachycardia I would like to get some electrolytes and other labs, but he kindly/politely declines.  States that he often has a high heart rate and is feeling extremely anxious, did offer Ativan which he also politely declined.  Did request Klonopin prescription which I told him that would be better suited for primary care physician.  Heart rate 108, otherwise hemodynamically stable at time of discharge, complete improvement of headache with headache cocktail.  Neurovascularly intact at time of discharge.    Patient was instructed to follow-up with PCP in the next 24 to 48

## 2024-12-03 NOTE — CONSULTS
Arranged consult with [facility] via web site. Consult ID: [id number]   Hyperglycemia, elevated HgA1c 13.4% (12/1) BMI >40

## 2024-12-16 ENCOUNTER — HOSPITAL ENCOUNTER (EMERGENCY)
Age: 29
Discharge: HOME | End: 2024-12-16
Attending: EMERGENCY MEDICINE

## 2024-12-16 VITALS
WEIGHT: 130 LBS | BODY MASS INDEX: 16.68 KG/M2 | HEIGHT: 74 IN | TEMPERATURE: 98.1 F | RESPIRATION RATE: 18 BRPM | SYSTOLIC BLOOD PRESSURE: 120 MMHG | OXYGEN SATURATION: 100 % | DIASTOLIC BLOOD PRESSURE: 89 MMHG | HEART RATE: 87 BPM

## 2024-12-16 DIAGNOSIS — R51.9 NONINTRACTABLE EPISODIC HEADACHE, UNSPECIFIED HEADACHE TYPE: ICD-10-CM

## 2024-12-16 DIAGNOSIS — F41.8 DEPRESSION WITH ANXIETY: Primary | ICD-10-CM

## 2024-12-16 PROCEDURE — 99283 EMERGENCY DEPT VISIT LOW MDM: CPT

## 2024-12-16 PROCEDURE — 6370000000 HC RX 637 (ALT 250 FOR IP): Performed by: EMERGENCY MEDICINE

## 2024-12-16 RX ORDER — HYDROXYZINE PAMOATE 25 MG/1
25 CAPSULE ORAL
Status: COMPLETED | OUTPATIENT
Start: 2024-12-16 | End: 2024-12-16

## 2024-12-16 RX ORDER — NAPROXEN 250 MG/1
500 TABLET ORAL
Status: COMPLETED | OUTPATIENT
Start: 2024-12-16 | End: 2024-12-16

## 2024-12-16 RX ORDER — NAPROXEN 500 MG/1
500 TABLET ORAL 2 TIMES DAILY WITH MEALS
Qty: 28 TABLET | Refills: 0 | Status: SHIPPED | OUTPATIENT
Start: 2024-12-16 | End: 2024-12-30

## 2024-12-16 RX ORDER — HYDROXYZINE PAMOATE 25 MG/1
25 CAPSULE ORAL 4 TIMES DAILY PRN
Qty: 20 CAPSULE | Refills: 0 | Status: SHIPPED | OUTPATIENT
Start: 2024-12-16 | End: 2024-12-23

## 2024-12-16 RX ADMIN — NAPROXEN 500 MG: 250 TABLET ORAL at 22:55

## 2024-12-16 RX ADMIN — HYDROXYZINE PAMOATE 25 MG: 25 CAPSULE ORAL at 22:56

## 2024-12-16 ASSESSMENT — PAIN - FUNCTIONAL ASSESSMENT: PAIN_FUNCTIONAL_ASSESSMENT: NONE - DENIES PAIN

## 2024-12-17 ENCOUNTER — HOSPITAL ENCOUNTER (EMERGENCY)
Age: 29
Discharge: HOME | End: 2024-12-17
Attending: EMERGENCY MEDICINE

## 2024-12-17 VITALS
TEMPERATURE: 97.8 F | OXYGEN SATURATION: 93 % | DIASTOLIC BLOOD PRESSURE: 60 MMHG | WEIGHT: 130 LBS | BODY MASS INDEX: 16.68 KG/M2 | SYSTOLIC BLOOD PRESSURE: 101 MMHG | RESPIRATION RATE: 16 BRPM | HEART RATE: 92 BPM | HEIGHT: 74 IN

## 2024-12-17 DIAGNOSIS — Z59.819 HOUSING INSTABILITY: ICD-10-CM

## 2024-12-17 DIAGNOSIS — F41.9 ANXIETY: Primary | ICD-10-CM

## 2024-12-17 PROBLEM — R45.4 IRRITABLE: Status: ACTIVE | Noted: 2024-12-17

## 2024-12-17 PROBLEM — F19.11 HISTORY OF SUBSTANCE ABUSE (HCC): Status: ACTIVE | Noted: 2024-12-17

## 2024-12-17 PROBLEM — R45.89 INEFFECTIVE COPING: Status: ACTIVE | Noted: 2024-12-17

## 2024-12-17 PROCEDURE — 99283 EMERGENCY DEPT VISIT LOW MDM: CPT

## 2024-12-17 PROCEDURE — 6370000000 HC RX 637 (ALT 250 FOR IP)

## 2024-12-17 RX ORDER — OLANZAPINE 5 MG/1
2.5-5 TABLET ORAL NIGHTLY
Qty: 14 TABLET | Refills: 0 | Status: SHIPPED | OUTPATIENT
Start: 2024-12-17 | End: 2024-12-31

## 2024-12-17 RX ORDER — OLANZAPINE 5 MG/1
5 TABLET, ORALLY DISINTEGRATING ORAL
Status: COMPLETED | OUTPATIENT
Start: 2024-12-17 | End: 2024-12-17

## 2024-12-17 RX ORDER — DIVALPROEX SODIUM 500 MG/1
500 TABLET, FILM COATED, EXTENDED RELEASE ORAL DAILY
Qty: 30 TABLET | Refills: 3 | Status: SHIPPED | OUTPATIENT
Start: 2024-12-17

## 2024-12-17 RX ADMIN — OLANZAPINE 5 MG: 5 TABLET, ORALLY DISINTEGRATING ORAL at 01:39

## 2024-12-17 SDOH — ECONOMIC STABILITY - HOUSING INSECURITY: HOUSING INSTABILITY UNSPECIFIED: Z59.819

## 2024-12-17 ASSESSMENT — ENCOUNTER SYMPTOMS
SHORTNESS OF BREATH: 0
ABDOMINAL PAIN: 0

## 2024-12-17 ASSESSMENT — PAIN - FUNCTIONAL ASSESSMENT: PAIN_FUNCTIONAL_ASSESSMENT: NONE - DENIES PAIN

## 2024-12-17 NOTE — ED PROVIDER NOTES
focal deficit present.      Mental Status: He is alert and oriented to person, place, and time.   Psychiatric:         Mood and Affect: Mood normal. Affect is flat.         Speech: Speech normal.         Behavior: Behavior normal. Behavior is cooperative.         Thought Content: Thought content normal. Thought content does not include homicidal or suicidal ideation. Thought content does not include homicidal or suicidal plan.         Cognition and Memory: Cognition and memory normal.         Judgment: Judgment normal.          Procedures     Procedures    Orders placed during this emergency department visit:   No orders of the defined types were placed in this encounter.       Medications given during this emergency department visit:     Medications   naproxen (NAPROSYN) tablet 500 mg (500 mg Oral Given 12/16/24 2963)   hydrOXYzine pamoate (VISTARIL) capsule 25 mg (25 mg Oral Given 12/16/24 3566)       New prescriptions:     Discharge Medication List as of 12/16/2024 11:02 PM        START taking these medications    Details   hydrOXYzine pamoate (VISTARIL) 25 MG capsule Take 1 capsule by mouth 4 times daily as needed for Anxiety, Disp-20 capsule, R-0Print      naproxen (NAPROSYN) 500 MG tablet Take 1 tablet by mouth 2 times daily (with meals) for 14 days, Disp-28 tablet, R-0Print              Past History and Complexity:     Past Medical History:   Diagnosis Date    TBI (traumatic brain injury) 2020        No past surgical history on file.     Social History     Socioeconomic History    Marital status: Single   Tobacco Use    Smoking status: Unknown   Vaping Use    Vaping status: Every Day    Substances: Nicotine   Substance and Sexual Activity    Alcohol use: Yes    Drug use: Defer     Types: Marijuana (Weed)    Sexual activity: Defer   Social History Narrative    ** Merged History Encounter **          Social Determinants of Health     Food Insecurity: Food Insecurity Present (11/1/2023)    Received from Medical

## 2024-12-17 NOTE — ED TRIAGE NOTES
Pt was just discharged. States what he came for wasn't addressed last time. Denies SI but reports anxiety. Wants to speak with a physciatrist. Pt read note over shoulder and said this is not what he is here for. Refused to further explain why he wishes to be seen again.

## 2024-12-17 NOTE — ED NOTES
Arranged rideshare to transport patient to [address] [place] [address] in [place]. ETA 1145 PM.     Joey Palacios  12/16/24 2332    Notified by registration that patient canceled his ride. Patient did not understand the trip that was booked was where he wanted to go.  Rebooked trip for patient to same requested location.    Notified by registration that patient canceled the second ride and wanted to check back in to the ED.       Joey Palacios  12/17/24 0016

## 2024-12-17 NOTE — ED TRIAGE NOTES
Having anxiety, started three hours ago.  [place] like people were out to get him, homeless, has had \"run inns\" with people down here.  90 hr, 100 RA, 138/78, afebrile,   Told ems no SI or HI.

## 2024-12-17 NOTE — DISCHARGE INSTRUCTIONS
Take medications as prescribed  Drink plenty fluids  Do not use illegal drugs as they can make your symptoms much worse  [facility]    Return to ER for any worsening symptoms or new problems which may arise

## 2024-12-17 NOTE — DISCHARGE INSTRUCTIONS
Please follow-up with [facility] at your scheduled visit.  I have sent in refills of your medication to the pharmacy.    Please return to the ED immediately with any new or worsening symptoms.  Provided you with resources as well, they are listed below.    [facility] [place]   [phone]   They have multiple resources to help you.  Please call.  [email or web address]     Other local resources that are available are:       [facility]    [phone]  [email or web address] for inpatient and outpatient substance abuse issues.    [facility] [phone]  Medication assisted treatment    [facility]  [phone]     Suicide Hotline   6-440-DSZIQPE     Narcotics Anonymous   www.na.org  Alcoholics Anonymous  www.aa.org          [place] National Suicide Hotline :  1-800 SUICIDE  1650.142.8297    5-505-881-TALK  [phone]   [facility]  [phone]   [facility] [phone]   [facility] [phone]   [facility] [phone]   [facility] [phone]   [facility] [phone]   [facility] [phone]   [facility] [phone]   Medicaid Office [phone]   [facility] [phone]   [facility] [phone]   [facility] [phone]   [facility] [phone]   [facility] [phone]   [facility] [phone]   [facility] [phone]   [facility] [phone]   [facility] [phone]   [facility] [phone]   [facility] [phone]

## 2024-12-17 NOTE — CONSULTS
PSYCHIATRIC EVALUATION    Date of Service: 2024    Patient Name: Rogelio Lazo Jr.  Patient : 1995  Patient MRN: [id number]    Purpose:    43195 - 1 hour psychiatric diagnostic interview with labs / me  Referral Source:  Jimena Quiros PA-C   History  From: patient, electronic medical record  Record Review: moderate      Chief Complaint   Patient presents with    Anxiety          Reason for Consult:  Homicidal ideation      History of Present Illness:  Patient is a 29 y.o. male with a history of being diagnosed with: substance abuse induced psychotic disorder, noncompliance, substance induced mood disorder, SI/SA, MDD, Antisocial Personality Disorder, ?schizophrenia, Bipolar,Adjustment Disorder, ADHD, ODD, PTSD, panic attack, Polysubstance abuse, stimulate use disorder, methamphetamine intoxication, cannabis abuse disorder, cocaine, fentanyl abuse, acute dystonic reaction to drugs, sleep disturbance, paranoia, housing instability, malingering, and violence, housing instability, psychosocial stressors, anxiety and depression, paranoia, passive suicidal ideations, noncompliance, marijuana abuse, methamphetamine abuse, adjustment reaction, alcohol use disorder, polysubstance dependence including opioid type drug without complication        Patient presented to the ED on 24 from community    History from the ED: Triage note - Pt was just discharged. States what he came for wasn't addressed last time. Denies SI but reports anxiety. Wants to speak with a physciatrist. Pt read note over shoulder and said this is not what he is here for. Refused to further explain why he wishes to be seen again.     PA note - Patient is a 29-year-old male with past medical history of substance abuse, homelessness, anxiety/depression psychosis presenting complaining of increased anxiety. Patient was evaluated at our facility earlier today and states when he came in for was not addressed. Appears he has frequented our

## 2024-12-17 NOTE — CARE COORDINATION
Chart review complete, CM met with pt at bedside, pt found laying on stretcher awakens to name being called, pt hard to understand d/t he is mumbling in his hoodie, pt eventually  states he is here \"because it is cold outside\".  When questioned about this and made aware you don't come to the ED d/t its cold outside just for a warm place to stay he states he \"needs some resources\" when pressed about resources he states he \"wants to shoot somebody\", and if y'all discharge me that's going to happen, pt is known to staff d/t multiple visits and known for malingering behavior. Tells CM he was to find a place to stay and food.  Pt has appointment at [facility] for 12/27/24 to reestablish  with them, aware of appointment.     Pt is pending psych evaluation and [facility].    CM will remain available to assist as needed.       12/17/24 0974   Service Assessment   Patient Orientation Alert and Oriented   Cognition Alert   History Provided By Patient   Primary Caregiver Self   Accompanied By/Relationship none   Support Systems None   Patient's Healthcare Decision Maker is: Legal Next of Kin   PCP Verified by CM Yes  (none)   Prior Functional Level Independent in ADLs/IADLs   Current Functional Level Independent in ADLs/IADLs   Can patient return to prior living arrangement Yes   Ability to make needs known: Good   Family able to assist with home care needs: No   Would you like for me to discuss the discharge plan with any other family members/significant others, and if so, who? No   Financial Resources None   Community Resources Housing   CM/ Referral Psychiatry   Social/Functional History   Lives With Alone   Type of Home Homeless   Bathroom Equipment None   Home Equipment None   Prior Level of Assist for ADLs Independent   Prior Level of Assist for Homemaking Independent   Homemaking Responsibilities No   Ambulation Assistance Independent   Prior Level of Assist for Transfers Independent   Active  No

## 2024-12-17 NOTE — ED PROVIDER NOTES
Emergency Department Provider Note       PCP: No primary care provider on file.   Age: 29 y.o.   Sex: male     DISPOSITION Decision To Discharge 12/17/2024 04:22:31 AM    ICD-10-CM    1. Anxiety  F41.9           Medical Decision Making     Patient is a 29-year-old male with past medical history of substance abuse, homelessness, anxiety/depression psychosis presenting complaining of increased anxiety.  Patient was evaluated at our facility earlier today and states when he came in for was not addressed.  Appears he has frequented our ED as well as outside ERs as well.  Patient was evaluated at [facility] twice yesterday and our facility before coming in again tonight.  He states he does not have any suicidal or homicidal thoughts or ideations.  He does not have any plan of suicide.  He states he wants his medication refilled and would like a sandwich.      On presentation, patient was tachycardic, upon recheck after sitting in our facility is no longer tachycardic without any intervention.  He is neurologically intact without any sensory deficit, motor weakness, cerebellar dysfunction, or any other neurologic deficit.  He is well-appearing in no acute distress.  He is calm and cooperative.  He denies multiple times any suicidal thoughts or ideations.  He has no plan of suicide.  Denies any homicidal ideations.  Denies any visual or auditory hallucinations.  Will give patient a dose of his Zyprexa and send refills of his medications into the pharmacy.  Patient states he is no longer taking the Risperdal and has been taking the Zyprexa and Depakote.  Patient states he would like a sandwich as well.  He is content with this plan.    0436: Preparing to discharge this patient, he states \"I am going to go shoot people up on the street \" he states that he is now homicidal, denies any harm to himself.  Will put in a psych room and have psych evaluate in the a.m.       1 chronic illness with exacerbation.  Prescription drug 
Patient denies SI, HI, AVH.  No delusions.  Per psychiatry, patient can be discharged home and follow-up for scheduled [facility] clinic visit on 12/27/2024.  No recommendations for discharge home with any medications at this time as he already has prescription.  Patient given return precautions.      Jj Sanchez MD; 12/17/2024 11:22 AM ===============               \      Jj Sanchez Jr., MD  12/17/24 1123

## 2024-12-17 NOTE — ED NOTES
Patient states he will cause harm to others when discharged. States \"I feel like my back is against the wall, I'm just going to go out here and fuck somebody up.\" Provider notified.      Shelley Mead, ANGELA  12/17/24 0435

## 2024-12-18 ENCOUNTER — HOSPITAL ENCOUNTER (EMERGENCY)
Age: 29
Discharge: HOME | End: 2024-12-18
Attending: STUDENT IN AN ORGANIZED HEALTH CARE EDUCATION/TRAINING PROGRAM

## 2024-12-18 VITALS
WEIGHT: 140 LBS | HEIGHT: 74 IN | TEMPERATURE: 98.3 F | OXYGEN SATURATION: 99 % | HEART RATE: 96 BPM | DIASTOLIC BLOOD PRESSURE: 93 MMHG | SYSTOLIC BLOOD PRESSURE: 129 MMHG | BODY MASS INDEX: 17.97 KG/M2 | RESPIRATION RATE: 18 BRPM

## 2024-12-18 DIAGNOSIS — R51.9 ACUTE NONINTRACTABLE HEADACHE, UNSPECIFIED HEADACHE TYPE: Primary | ICD-10-CM

## 2024-12-18 DIAGNOSIS — F41.1 ANXIETY STATE: ICD-10-CM

## 2024-12-18 PROCEDURE — 6370000000 HC RX 637 (ALT 250 FOR IP): Performed by: STUDENT IN AN ORGANIZED HEALTH CARE EDUCATION/TRAINING PROGRAM

## 2024-12-18 PROCEDURE — 99283 EMERGENCY DEPT VISIT LOW MDM: CPT

## 2024-12-18 RX ORDER — ACETAMINOPHEN 500 MG
1000 TABLET ORAL ONCE
Status: COMPLETED | OUTPATIENT
Start: 2024-12-18 | End: 2024-12-18

## 2024-12-18 RX ORDER — LORAZEPAM 1 MG/1
2 TABLET ORAL ONCE
Status: COMPLETED | OUTPATIENT
Start: 2024-12-18 | End: 2024-12-18

## 2024-12-18 RX ADMIN — LORAZEPAM 2 MG: 1 TABLET ORAL at 20:03

## 2024-12-18 RX ADMIN — ACETAMINOPHEN 1000 MG: 500 TABLET, FILM COATED ORAL at 20:03

## 2024-12-18 ASSESSMENT — ENCOUNTER SYMPTOMS
COUGH: 0
NAUSEA: 0
EYE PAIN: 0
ABDOMINAL PAIN: 0
SORE THROAT: 0
SHORTNESS OF BREATH: 0
VOMITING: 0
EYE REDNESS: 0

## 2024-12-18 ASSESSMENT — PAIN DESCRIPTION - LOCATION: LOCATION: HEAD

## 2024-12-18 ASSESSMENT — PAIN - FUNCTIONAL ASSESSMENT: PAIN_FUNCTIONAL_ASSESSMENT: 0-10

## 2024-12-18 ASSESSMENT — PAIN SCALES - GENERAL: PAINLEVEL_OUTOF10: 6

## 2024-12-19 ENCOUNTER — HOSPITAL ENCOUNTER (EMERGENCY)
Age: 29
Discharge: LEFT BEFORE DISCHARGE | End: 2024-12-19

## 2024-12-19 ENCOUNTER — HOSPITAL ENCOUNTER (EMERGENCY)
Age: 29
Discharge: HOME | End: 2024-12-19

## 2024-12-19 VITALS
HEART RATE: 96 BPM | DIASTOLIC BLOOD PRESSURE: 88 MMHG | TEMPERATURE: 98.7 F | HEIGHT: 74 IN | SYSTOLIC BLOOD PRESSURE: 133 MMHG | RESPIRATION RATE: 16 BRPM | WEIGHT: 140 LBS | BODY MASS INDEX: 17.97 KG/M2 | OXYGEN SATURATION: 100 %

## 2024-12-19 VITALS
DIASTOLIC BLOOD PRESSURE: 80 MMHG | HEIGHT: 74 IN | TEMPERATURE: 98 F | OXYGEN SATURATION: 94 % | SYSTOLIC BLOOD PRESSURE: 132 MMHG | BODY MASS INDEX: 17.97 KG/M2 | WEIGHT: 140 LBS | HEART RATE: 78 BPM | RESPIRATION RATE: 18 BRPM

## 2024-12-19 DIAGNOSIS — Z76.5 MALINGERING: ICD-10-CM

## 2024-12-19 DIAGNOSIS — Z13.9 ENCOUNTER FOR MEDICAL SCREENING EXAMINATION: ICD-10-CM

## 2024-12-19 DIAGNOSIS — F41.1 ANXIETY STATE: Primary | ICD-10-CM

## 2024-12-19 PROCEDURE — 6370000000 HC RX 637 (ALT 250 FOR IP)

## 2024-12-19 PROCEDURE — 99283 EMERGENCY DEPT VISIT LOW MDM: CPT

## 2024-12-19 PROCEDURE — 99285 EMERGENCY DEPT VISIT HI MDM: CPT

## 2024-12-19 PROCEDURE — 6370000000 HC RX 637 (ALT 250 FOR IP): Performed by: NURSE PRACTITIONER

## 2024-12-19 RX ORDER — LORAZEPAM 1 MG/1
2 TABLET ORAL
Status: COMPLETED | OUTPATIENT
Start: 2024-12-19 | End: 2024-12-19

## 2024-12-19 RX ORDER — IBUPROFEN 800 MG/1
800 TABLET, FILM COATED ORAL
Status: COMPLETED | OUTPATIENT
Start: 2024-12-19 | End: 2024-12-19

## 2024-12-19 RX ORDER — HYDROXYZINE PAMOATE 25 MG/1
25 CAPSULE ORAL
Status: COMPLETED | OUTPATIENT
Start: 2024-12-19 | End: 2024-12-19

## 2024-12-19 RX ORDER — HYDROXYZINE HYDROCHLORIDE 25 MG/1
25 TABLET, FILM COATED ORAL EVERY 8 HOURS PRN
Qty: 30 TABLET | Refills: 0 | Status: SHIPPED | OUTPATIENT
Start: 2024-12-19 | End: 2024-12-29

## 2024-12-19 RX ORDER — ONDANSETRON 4 MG/1
4 TABLET, ORALLY DISINTEGRATING ORAL
Status: COMPLETED | OUTPATIENT
Start: 2024-12-19 | End: 2024-12-19

## 2024-12-19 RX ADMIN — LORAZEPAM 2 MG: 1 TABLET ORAL at 02:00

## 2024-12-19 RX ADMIN — IBUPROFEN 800 MG: 800 TABLET, FILM COATED ORAL at 15:36

## 2024-12-19 RX ADMIN — ONDANSETRON 4 MG: 4 TABLET, ORALLY DISINTEGRATING ORAL at 15:36

## 2024-12-19 RX ADMIN — HYDROXYZINE PAMOATE 25 MG: 25 CAPSULE ORAL at 15:36

## 2024-12-19 ASSESSMENT — PAIN - FUNCTIONAL ASSESSMENT
PAIN_FUNCTIONAL_ASSESSMENT: 0-10
PAIN_FUNCTIONAL_ASSESSMENT: NONE - DENIES PAIN

## 2024-12-19 ASSESSMENT — LIFESTYLE VARIABLES
HOW OFTEN DO YOU HAVE A DRINK CONTAINING ALCOHOL: 2-4 TIMES A MONTH
HOW MANY STANDARD DRINKS CONTAINING ALCOHOL DO YOU HAVE ON A TYPICAL DAY: 1 OR 2
HOW OFTEN DO YOU HAVE A DRINK CONTAINING ALCOHOL: MONTHLY OR LESS
HOW MANY STANDARD DRINKS CONTAINING ALCOHOL DO YOU HAVE ON A TYPICAL DAY: 1 OR 2

## 2024-12-19 ASSESSMENT — PAIN DESCRIPTION - LOCATION
LOCATION: HEAD
LOCATION: HEAD

## 2024-12-19 ASSESSMENT — PAIN SCALES - GENERAL
PAINLEVEL_OUTOF10: 5
PAINLEVEL_OUTOF10: 5

## 2024-12-19 ASSESSMENT — PAIN DESCRIPTION - ORIENTATION: ORIENTATION: ANTERIOR

## 2024-12-19 ASSESSMENT — PAIN DESCRIPTION - DESCRIPTORS: DESCRIPTORS: THROBBING

## 2024-12-19 NOTE — ED TRIAGE NOTES
Pt to ED via [facility] for anxiety. Pt states he feels like he is being followed. Pt was seen at [facility] for same complaint.

## 2024-12-19 NOTE — ED PROVIDER NOTES
Emergency Department Provider Note       PCP: No primary care provider on file.   Age: 29 y.o.   Sex: male     DISPOSITION      ICD-10-CM    1. Anxiety state  F41.1       2. Malingering  Z76.5           Medical Decision Making     Patient is a 29-year-old male with past medical history of homelessness, drug abuse, and frequent visits to the ED for suicidal ideation presents complaining of thoughts of suicide.  Of note it is almost 2 AM in the morning and patient states it is \"not safe for him to be outside\" and he has nowhere else to go.    Patient was evaluated at [facility] and in our ED twice yesterday, the following day was evaluated at both ED's, and on the 16th, 3 days ago was evaluated 3 times.  This is his ninth ED visit this week.  Patient has chronic suicidal thoughts and comes into the department stating he is suicidal frequently. Has been evaluated by psych less than 48 hours ago. Has a history of drug abuse as well including THC and Meth. He states he feels suicidal but has no plan of suicide. He denies any visual or auditory hallucinations.     Did have my attending, Dr. Ramírez also see this patient. He states he came in for a panic attack and then added on suicidal thoughts when we discussed discharge. Patient has a long history of malingering and using the ED for shelter. He was given full resource packet with all the shelters and mental health help in the area. Have given him ativan for anxiety, will send home with hydroxyzine. Patient with chronic problems and no active plan for suicide. Patient will be discharged with outpatient management.       ED Course as of 12/19/24 0345   Thu Dec 19, 2024   0241 Evaluated patient at request of TOÑITO Quiros. He initially reported me that he was just here for panic attack. When we discussed discharge planning he mentioned he was suicidal. No clear plan. This appears to be malingering for shelter. Recently seen here 48 hours ago for same and was cleared by

## 2024-12-19 NOTE — ED PROVIDER NOTES
Emergency Department Provider Note       PCP: No primary care provider on file.   Age: 29 y.o.   Sex: male     DISPOSITION    No diagnosis found.    Medical Decision Making     29-year-old AA male with a past medical history of TBI, anxiety, depression, homelessness, presents emergency room with a chief complaint of panic attack, and anxiety.  He also complains of mild headache.  States his headache does not feel any different than his other headaches.  And is normally relieved with Tylenol.  He states when he gets anxious like this and has a headache.  Ibuprofen Tylenol or hydroxyzine will really normally relieve his headache.  Patient is been seen on multiple occasions of multiple ED facilities as well as ours twice in the past day.  With anxiety, and malingering.  He currently denies any SI HI or AVH.  And is able to verbally contract for safety.    Patient was seen and evaluated in the emergency department.  He offers no systemic complaints.  Just a mild headache but is requesting Tylenol outperform Profen for.  Will give ibuprofen, hydroxyzine, and ondansetron to see if this relieves his headache and his current anxiety state.    Prior to medications being given.  Registration did go into the room.  The patient refused to sign consent for treatment.  He did consent to my medical screening exam.  He has no emergent medical findings.  I advised if he does not want to sign a consent to treatment we do not have to give medications.  He is welcome to leave and that we cannot hold him against as well.  He states he has not gone to sign any paperwork.  He does continue to deny any SI HI or AVH.  And he proceeded to leave the emergency room.    Security was notified.    Advised the patient to check back in any time.     1 acute, uncomplicated illness or injury.  Shared medical decision making was utilized in creating the patients health plan today.  I independently ordered and reviewed each unique test.    I

## 2024-12-19 NOTE — ED NOTES
Justin arranged to transport patient to [address] [place] [address]  AM.     Joey Palacios  12/19/24 0615

## 2024-12-19 NOTE — ED NOTES
Pt asked for a phone from this RN, pt was given phone and he reported that it would not work, asked him to please give me a few minutes to help him d/t this RN helping someone else.  Pt then told this RN \"don't insult my intellegence\".  This RN then told him \"I did not say that, I was just asking you to please wait a minute\".  While this RN was trying to help another RN, pt then told registration that he would not sign his consent to be seen.  Pt then started pacing and mumbling to himself,  this RN then got security to come and talk to pt.  Pt kept not wanting to sign his paperwork and then reports that he wanted to leave.  Pt was escorted out to lobby by security.  MD aware of pt wanting to leave.

## 2024-12-19 NOTE — ED PROVIDER NOTES
Years of education: None    Highest education level: None   Tobacco Use    Smoking status: Unknown   Vaping Use    Vaping status: Every Day    Substances: Nicotine   Substance and Sexual Activity    Alcohol use: Yes    Drug use: Defer     Types: Marijuana (Weed)    Sexual activity: Defer   Social History Narrative    ** Merged History Encounter **          Social Determinants of Health     Food Insecurity: Food Insecurity Present (11/1/2023)    Received from [facility]    Hunger Vital Sign     Worried About Running Out of Food in the Last Year: Sometimes true     Ran Out of Food in the Last Year: Sometimes true   Transportation Needs: Unmet Transportation Needs (11/1/2023)    Received from [facility]    PRAPARE - Transportation     Lack of Transportation (Medical): Yes     Lack of Transportation (Non-Medical): Yes   Social Connections: Unknown (3/20/2021)    [facility]    Social Connections     Frequency of Communication with Friends and Family: Not asked     Frequency of Social Gatherings with Friends and Family: Not asked   Intimate Partner Violence: Not At Risk (11/1/2023)    Received from [facility]    Abuse Screen     Feels Unsafe at Home or Work/School: no     Feels Threatened by Someone: no     Does Anyone Try to Keep You From Having Contact with Others or Doing Things Outside Your Home?: no     Physical Signs of Abuse Present: no   Housing Stability: Not At Risk (3/9/2022)    [facility]    Housing Stability     Was there a time when you did not have a steady place to sleep: Not asked     Worried that the place you are staying is making you sick: Not asked        Previous Medications    CLONAZEPAM (KLONOPIN) 1 MG TABLET    Take 1 tablet by mouth 2 times daily as needed for Anxiety for up to 2 days. Max Daily Amount: 2 mg    DIVALPROEX (DEPAKOTE ER) 500 MG EXTENDED

## 2024-12-19 NOTE — ED NOTES
Constant Observer Yes - Name: Agustin Royal Observer Oriented yes   High risk patients are in line of sight at all times Yes   Excess equipment/medical supplies not necessary for the care of the patient removed Yes   All sharp or dangerous objects are removed from room: including but not limited to belts, pens & pencils, needles, medications, cosmetics, lighters, matches, nail files, watches, necklaces, glass objects, razors, razor blades, knives, aerosol sprays, drawstring pants, shoes, cords (telephone, call bells, etc.) cleaning wipes or other cleaning items, aluminum cans, not permanently attached wall décor Yes   Telephone/cell phone removed as well as TV remote (batteries can be swallowed) Yes   Patient belongings removed and labeled at nurses station Yes   Excess linen is removed from room Yes   All plastic bags are removed from the room and replaced with paper trash bags Yes   Patient is in paper scrubs or appropriate gown and using hospital socks with rubber soles Yes   No metal, hard eating utensils or hard plates are on meal tray Yes   Remove all cleaning agents used by Environmental Services Yes   If Crucifix is hanging on a nail, remove Crucifix as well as the nail No - NA       *If any question above is answered \"No,\" documentation is required.        Josie Pang RN  12/19/24 0149

## 2024-12-19 NOTE — ED TRIAGE NOTES
Pt presents via EMS with complaint anxiety and migraine.  Pt told EMS he has history of TBI and migraines.      Pt also co anxiety attack in triage.  He states he normally takes haldol but is out.  Patient respirations even and unlabored. No distress noted in triage.

## 2024-12-19 NOTE — DISCHARGE INSTRUCTIONS
Use the resource packet to find help with housing and behavioral health.  He take the hydroxyzine as needed for anxiety.

## 2024-12-19 NOTE — ED NOTES
Patient mobility status  with no difficulty.     I have reviewed discharge instructions with the patient.  The patient verbalized understanding.    Patient left ED via Discharge Method: ambulatory to Home with  self .    Opportunity for questions and clarification provided.     Patient given 0 scripts.

## 2024-12-19 NOTE — ED TRIAGE NOTES
Pt presents to the ED via [facility] from outside of [facility] c/o mental health problems. States he had a mental break. Initially, denied SI. Later patient stated he was having suicidal thoughts.

## 2025-01-19 ENCOUNTER — HOSPITAL ENCOUNTER (EMERGENCY)
Age: 30
Discharge: HOME | End: 2025-01-19
Attending: STUDENT IN AN ORGANIZED HEALTH CARE EDUCATION/TRAINING PROGRAM

## 2025-01-19 VITALS
RESPIRATION RATE: 17 BRPM | DIASTOLIC BLOOD PRESSURE: 80 MMHG | HEIGHT: 74 IN | OXYGEN SATURATION: 98 % | TEMPERATURE: 98.5 F | SYSTOLIC BLOOD PRESSURE: 121 MMHG | WEIGHT: 150 LBS | BODY MASS INDEX: 19.25 KG/M2 | HEART RATE: 90 BPM

## 2025-01-19 DIAGNOSIS — R51.9 NONINTRACTABLE HEADACHE, UNSPECIFIED CHRONICITY PATTERN, UNSPECIFIED HEADACHE TYPE: Primary | ICD-10-CM

## 2025-01-19 DIAGNOSIS — Z78.9 ALCOHOL USE: ICD-10-CM

## 2025-01-19 DIAGNOSIS — F41.1 ANXIETY STATE: ICD-10-CM

## 2025-01-19 LAB
AMPHET UR QL SCN: POSITIVE
BARBITURATES UR QL SCN: NEGATIVE
BENZODIAZ UR QL: NEGATIVE
CANNABINOIDS UR QL SCN: NEGATIVE
COCAINE UR QL SCN: NEGATIVE
METHADONE UR QL: NEGATIVE
OPIATES UR QL: NEGATIVE
PCP UR QL: NEGATIVE

## 2025-01-19 PROCEDURE — 6370000000 HC RX 637 (ALT 250 FOR IP): Performed by: STUDENT IN AN ORGANIZED HEALTH CARE EDUCATION/TRAINING PROGRAM

## 2025-01-19 PROCEDURE — 99283 EMERGENCY DEPT VISIT LOW MDM: CPT

## 2025-01-19 PROCEDURE — 80307 DRUG TEST PRSMV CHEM ANLYZR: CPT

## 2025-01-19 RX ORDER — IBUPROFEN 800 MG/1
800 TABLET, FILM COATED ORAL
Status: COMPLETED | OUTPATIENT
Start: 2025-01-19 | End: 2025-01-19

## 2025-01-19 RX ORDER — DIPHENHYDRAMINE HYDROCHLORIDE 50 MG/ML
25 INJECTION INTRAMUSCULAR; INTRAVENOUS
Status: DISCONTINUED | OUTPATIENT
Start: 2025-01-19 | End: 2025-01-19

## 2025-01-19 RX ORDER — PROCHLORPERAZINE EDISYLATE 5 MG/ML
10 INJECTION INTRAMUSCULAR; INTRAVENOUS
Status: DISCONTINUED | OUTPATIENT
Start: 2025-01-19 | End: 2025-01-19

## 2025-01-19 RX ORDER — DEXAMETHASONE SODIUM PHOSPHATE 10 MG/ML
10 INJECTION INTRAMUSCULAR; INTRAVENOUS ONCE
Status: DISCONTINUED | OUTPATIENT
Start: 2025-01-19 | End: 2025-01-19

## 2025-01-19 RX ORDER — HYDROXYZINE PAMOATE 25 MG/1
50 CAPSULE ORAL
Status: COMPLETED | OUTPATIENT
Start: 2025-01-19 | End: 2025-01-19

## 2025-01-19 RX ADMIN — HYDROXYZINE PAMOATE 50 MG: 25 CAPSULE ORAL at 00:45

## 2025-01-19 RX ADMIN — IBUPROFEN 800 MG: 800 TABLET, FILM COATED ORAL at 00:45

## 2025-01-19 ASSESSMENT — PAIN - FUNCTIONAL ASSESSMENT: PAIN_FUNCTIONAL_ASSESSMENT: NONE - DENIES PAIN

## 2025-01-19 ASSESSMENT — PAIN SCALES - GENERAL: PAINLEVEL_OUTOF10: 0

## 2025-01-19 NOTE — ED PROVIDER NOTES
Emergency Department Provider Note       PCP: No primary care provider on file.   Age: 29 y.o.   Sex: male     DISPOSITION Decision To Discharge 01/19/2025 01:11:00 AM    ICD-10-CM    1. Nonintractable headache, unspecified chronicity pattern, unspecified headache type  R51.9       2. Anxiety state  F41.1       3. Alcohol use  Z78.9           Medical Decision Making     29-year-old male patient presenting to this department via EMS from local [facility].  Patient is reporting generalized anxiety, at times paranoia.  He does not elaborate on the specifics of the symptoms but states he has felt this way intermittently over the past several days.  He does admit to alcohol use today.  He freely admits to frequent use of the ER, states tonight he has nowhere to go but has plans to contact his mother who works at a local hotel and can facilitate a place for him to stay.  I have offered to help with his anxiety and headache.  He does not wish to undergo IV placement or blood drawl.  Patient reports no thoughts of suicide or homicidal ideation.  He is alert, oriented, does appear slightly anxious but not overtly intoxicated.  He is ambulating without difficulty in this department.  Will give oral Motrin, Vistaril.  I will provide patient with outpatient resources for his mental health needs.       1 or more acute illnesses that pose a threat to life or bodily function.   1 or more chronic illnesses with a severe exacerbation or progression.  Prescription drug management performed.  Chronic medical problems impacting care include history of headache, history of anxiety, alcohol abuse.  I independently ordered and reviewed each unique test.    I reviewed external records: ED visit note from a different ED.   I reviewed external records: provider visit note from PCP.  I reviewed external records: provider visit note from outside specialist.   The patients assessment required an independent historian: EMS providers.  The

## 2025-01-19 NOTE — DISCHARGE INSTRUCTIONS
[facility] [place]   [phone]   They have multiple resources to help you.  Please call.  [email or web address]     Other local resources that are available are:       [facility]    [phone]  [email or web address] for inpatient and outpatient substance abuse issues.    [facility] [phone]  Medication assisted treatment    [facility]  [phone]     Suicide Hotline   9-745-YOUZFAK     Narcotics Anonymous   www.na.org  Alcoholics Anonymous  www.aa.org

## 2025-01-19 NOTE — ED TRIAGE NOTES
Patient arrives via ems from a fast food restaurant parking lot. Patient states he has had a panic attack for the past 2 hours as well as a HA. Patient has a history of anxiety. Patient has used ETOH and marijuana. Heart rate was 120 with EMS.

## 2025-01-19 NOTE — ED NOTES
Patient mobility status  with no difficulty.     I have reviewed discharge instructions with the patient.  The patient verbalized understanding.    Patient left ED via Discharge Method: ambulatory to Home with  Self .    Opportunity for questions and clarification provided.     Patient given 0 scripts.           Aleyda Miles, RN  01/19/25 0229

## 2025-03-19 NOTE — ED NOTES
Patient resting at this time. Respirations are unlabored and even. No signs of distress noted. Safety precautions in place. Sitter at bedside.           Vaibhav Martin RN  10/12/23 2008 2